# Patient Record
Sex: MALE | Race: WHITE | NOT HISPANIC OR LATINO | Employment: OTHER | URBAN - NONMETROPOLITAN AREA
[De-identification: names, ages, dates, MRNs, and addresses within clinical notes are randomized per-mention and may not be internally consistent; named-entity substitution may affect disease eponyms.]

---

## 2019-04-08 ENCOUNTER — TRANSCRIBE ORDERS (OUTPATIENT)
Dept: ADMINISTRATIVE | Facility: HOSPITAL | Age: 59
End: 2019-04-08

## 2019-04-08 DIAGNOSIS — M25.512 LEFT SHOULDER PAIN, UNSPECIFIED CHRONICITY: Primary | ICD-10-CM

## 2019-04-15 ENCOUNTER — HOSPITAL ENCOUNTER (OUTPATIENT)
Dept: MRI IMAGING | Facility: HOSPITAL | Age: 59
Discharge: HOME | End: 2019-04-15
Admitting: ORTHOPAEDIC SURGERY

## 2019-04-15 DIAGNOSIS — M25.512 LEFT SHOULDER PAIN, UNSPECIFIED CHRONICITY: ICD-10-CM

## 2019-04-15 PROCEDURE — 73221 MRI JOINT UPR EXTREM W/O DYE: CPT

## 2019-06-10 ENCOUNTER — APPOINTMENT (OUTPATIENT)
Dept: PREADMISSION TESTING | Facility: HOSPITAL | Age: 59
End: 2019-06-10

## 2019-06-10 VITALS
SYSTOLIC BLOOD PRESSURE: 146 MMHG | DIASTOLIC BLOOD PRESSURE: 92 MMHG | RESPIRATION RATE: 18 BRPM | HEIGHT: 69 IN | BODY MASS INDEX: 33.83 KG/M2 | OXYGEN SATURATION: 98 % | HEART RATE: 103 BPM | WEIGHT: 228.4 LBS

## 2019-06-10 RX ORDER — ALLOPURINOL 100 MG/1
100 TABLET ORAL 2 TIMES DAILY
COMMUNITY

## 2019-06-17 ENCOUNTER — ANESTHESIA EVENT (OUTPATIENT)
Dept: PERIOP | Facility: HOSPITAL | Age: 59
End: 2019-06-17

## 2019-06-17 ENCOUNTER — HOSPITAL ENCOUNTER (OUTPATIENT)
Facility: HOSPITAL | Age: 59
Setting detail: HOSPITAL OUTPATIENT SURGERY
Discharge: HOME | End: 2019-06-17
Attending: ORTHOPAEDIC SURGERY | Admitting: ORTHOPAEDIC SURGERY

## 2019-06-17 ENCOUNTER — ANESTHESIA (OUTPATIENT)
Dept: PERIOP | Facility: HOSPITAL | Age: 59
End: 2019-06-17

## 2019-06-17 VITALS
RESPIRATION RATE: 20 BRPM | OXYGEN SATURATION: 95 % | TEMPERATURE: 97.2 F | HEART RATE: 84 BPM | DIASTOLIC BLOOD PRESSURE: 86 MMHG | SYSTOLIC BLOOD PRESSURE: 137 MMHG

## 2019-06-17 PROBLEM — M75.102 LEFT ROTATOR CUFF TEAR: Status: ACTIVE | Noted: 2019-06-17

## 2019-06-17 LAB
DEPRECATED RDW RBC AUTO: 41.3 FL (ref 40–54)
ERYTHROCYTE [DISTWIDTH] IN BLOOD BY AUTOMATED COUNT: 12.3 % (ref 12–15)
HCT VFR BLD AUTO: 45.8 % (ref 40–52)
HGB BLD-MCNC: 16 G/DL (ref 14–18)
INR PPP: 0.97 (ref 0.91–1.09)
MCH RBC QN AUTO: 32.1 PG (ref 28–32)
MCHC RBC AUTO-ENTMCNC: 34.9 G/DL (ref 33–36)
MCV RBC AUTO: 91.8 FL (ref 82–95)
PLATELET # BLD AUTO: 189 10*3/MM3 (ref 130–400)
PMV BLD AUTO: 9.8 FL (ref 6–12)
PROTHROMBIN TIME: 13.2 SECONDS (ref 11.9–14.6)
RBC # BLD AUTO: 4.99 10*6/MM3 (ref 4.8–5.9)
WBC NRBC COR # BLD: 6.41 10*3/MM3 (ref 4.8–10.8)

## 2019-06-17 PROCEDURE — C1713 ANCHOR/SCREW BN/BN,TIS/BN: HCPCS | Performed by: ORTHOPAEDIC SURGERY

## 2019-06-17 PROCEDURE — 25010000002 FENTANYL CITRATE (PF) 100 MCG/2ML SOLUTION: Performed by: ANESTHESIOLOGY

## 2019-06-17 PROCEDURE — 25010000002 ROPIVACAINE PER 1 MG: Performed by: ANESTHESIOLOGY

## 2019-06-17 PROCEDURE — 25010000002 PROPOFOL 10 MG/ML EMULSION: Performed by: NURSE ANESTHETIST, CERTIFIED REGISTERED

## 2019-06-17 PROCEDURE — 85027 COMPLETE CBC AUTOMATED: CPT | Performed by: ORTHOPAEDIC SURGERY

## 2019-06-17 PROCEDURE — 25010000002 DEXAMETHASONE PER 1 MG: Performed by: NURSE ANESTHETIST, CERTIFIED REGISTERED

## 2019-06-17 PROCEDURE — 25010000002 EPINEPHRINE PER 0.1 MG: Performed by: ORTHOPAEDIC SURGERY

## 2019-06-17 PROCEDURE — 25010000002 ONDANSETRON PER 1 MG: Performed by: NURSE ANESTHETIST, CERTIFIED REGISTERED

## 2019-06-17 PROCEDURE — 25010000002 MIDAZOLAM PER 1 MG: Performed by: ANESTHESIOLOGY

## 2019-06-17 PROCEDURE — 85610 PROTHROMBIN TIME: CPT | Performed by: ORTHOPAEDIC SURGERY

## 2019-06-17 DEVICE — SUT FIBERLINK W/SUT TP 1.3MM WHT/BLU: Type: IMPLANTABLE DEVICE | Status: FUNCTIONAL

## 2019-06-17 DEVICE — SYS SUT/ANCH BIOCOMP SPEEDBRIDGE SWIVELK 4.75X19.1: Type: IMPLANTABLE DEVICE | Status: FUNCTIONAL

## 2019-06-17 DEVICE — SUT/ANCH BIOCOMP PUSH/LK 2.9X12.5MM: Type: IMPLANTABLE DEVICE | Status: FUNCTIONAL

## 2019-06-17 RX ORDER — ROCURONIUM BROMIDE 10 MG/ML
INJECTION, SOLUTION INTRAVENOUS AS NEEDED
Status: DISCONTINUED | OUTPATIENT
Start: 2019-06-17 | End: 2019-06-17 | Stop reason: SURG

## 2019-06-17 RX ORDER — SODIUM CHLORIDE 0.9 % (FLUSH) 0.9 %
1-10 SYRINGE (ML) INJECTION AS NEEDED
Status: DISCONTINUED | OUTPATIENT
Start: 2019-06-17 | End: 2019-06-17 | Stop reason: HOSPADM

## 2019-06-17 RX ORDER — ASPIRIN 325 MG
325 TABLET, DELAYED RELEASE (ENTERIC COATED) ORAL 2 TIMES DAILY
Qty: 42 TABLET | Refills: 0 | Status: SHIPPED | OUTPATIENT
Start: 2019-06-17 | End: 2019-07-08

## 2019-06-17 RX ORDER — MAGNESIUM HYDROXIDE 1200 MG/15ML
LIQUID ORAL AS NEEDED
Status: DISCONTINUED | OUTPATIENT
Start: 2019-06-17 | End: 2019-06-17 | Stop reason: HOSPADM

## 2019-06-17 RX ORDER — OXYCODONE AND ACETAMINOPHEN 7.5; 325 MG/1; MG/1
1-2 TABLET ORAL EVERY 4 HOURS PRN
Qty: 60 TABLET | Refills: 0 | Status: SHIPPED | OUTPATIENT
Start: 2019-06-17

## 2019-06-17 RX ORDER — MIDAZOLAM HYDROCHLORIDE 1 MG/ML
1 INJECTION INTRAMUSCULAR; INTRAVENOUS
Status: DISCONTINUED | OUTPATIENT
Start: 2019-06-17 | End: 2019-06-17 | Stop reason: HOSPADM

## 2019-06-17 RX ORDER — PROPOFOL 10 MG/ML
VIAL (ML) INTRAVENOUS AS NEEDED
Status: DISCONTINUED | OUTPATIENT
Start: 2019-06-17 | End: 2019-06-17 | Stop reason: SURG

## 2019-06-17 RX ORDER — METOCLOPRAMIDE HYDROCHLORIDE 5 MG/ML
5 INJECTION INTRAMUSCULAR; INTRAVENOUS
Status: DISCONTINUED | OUTPATIENT
Start: 2019-06-17 | End: 2019-06-17 | Stop reason: HOSPADM

## 2019-06-17 RX ORDER — EPINEPHRINE 1 MG/ML
INJECTION, SOLUTION, CONCENTRATE INTRAVENOUS AS NEEDED
Status: DISCONTINUED | OUTPATIENT
Start: 2019-06-17 | End: 2019-06-17 | Stop reason: HOSPADM

## 2019-06-17 RX ORDER — NALOXONE HCL 0.4 MG/ML
0.04 VIAL (ML) INJECTION AS NEEDED
Status: DISCONTINUED | OUTPATIENT
Start: 2019-06-17 | End: 2019-06-17 | Stop reason: HOSPADM

## 2019-06-17 RX ORDER — ONDANSETRON 2 MG/ML
4 INJECTION INTRAMUSCULAR; INTRAVENOUS AS NEEDED
Status: DISCONTINUED | OUTPATIENT
Start: 2019-06-17 | End: 2019-06-17 | Stop reason: HOSPADM

## 2019-06-17 RX ORDER — PHENYLEPHRINE HCL IN 0.9% NACL 0.8MG/10ML
SYRINGE (ML) INTRAVENOUS AS NEEDED
Status: DISCONTINUED | OUTPATIENT
Start: 2019-06-17 | End: 2019-06-17 | Stop reason: SURG

## 2019-06-17 RX ORDER — EPHEDRINE SULFATE 50 MG/ML
INJECTION INTRAVENOUS AS NEEDED
Status: DISCONTINUED | OUTPATIENT
Start: 2019-06-17 | End: 2019-06-17 | Stop reason: SURG

## 2019-06-17 RX ORDER — ONDANSETRON 4 MG/1
4 TABLET, FILM COATED ORAL EVERY 8 HOURS PRN
Qty: 20 TABLET | Refills: 1 | Status: SHIPPED | OUTPATIENT
Start: 2019-06-17

## 2019-06-17 RX ORDER — SODIUM CHLORIDE, SODIUM LACTATE, POTASSIUM CHLORIDE, CALCIUM CHLORIDE 600; 310; 30; 20 MG/100ML; MG/100ML; MG/100ML; MG/100ML
100 INJECTION, SOLUTION INTRAVENOUS CONTINUOUS
Status: DISCONTINUED | OUTPATIENT
Start: 2019-06-17 | End: 2019-06-17 | Stop reason: HOSPADM

## 2019-06-17 RX ORDER — MORPHINE SULFATE 2 MG/ML
2 INJECTION, SOLUTION INTRAMUSCULAR; INTRAVENOUS
Status: DISCONTINUED | OUTPATIENT
Start: 2019-06-17 | End: 2019-06-17 | Stop reason: HOSPADM

## 2019-06-17 RX ORDER — SODIUM CHLORIDE 0.9 % (FLUSH) 0.9 %
3 SYRINGE (ML) INJECTION AS NEEDED
Status: DISCONTINUED | OUTPATIENT
Start: 2019-06-17 | End: 2019-06-17 | Stop reason: HOSPADM

## 2019-06-17 RX ORDER — BUPIVACAINE HCL/0.9 % NACL/PF 0.1 %
2 PLASTIC BAG, INJECTION (ML) EPIDURAL ONCE
Status: COMPLETED | OUTPATIENT
Start: 2019-06-17 | End: 2019-06-17

## 2019-06-17 RX ORDER — IPRATROPIUM BROMIDE AND ALBUTEROL SULFATE 2.5; .5 MG/3ML; MG/3ML
3 SOLUTION RESPIRATORY (INHALATION) ONCE AS NEEDED
Status: DISCONTINUED | OUTPATIENT
Start: 2019-06-17 | End: 2019-06-17 | Stop reason: HOSPADM

## 2019-06-17 RX ORDER — FENTANYL CITRATE 50 UG/ML
25 INJECTION, SOLUTION INTRAMUSCULAR; INTRAVENOUS
Status: DISCONTINUED | OUTPATIENT
Start: 2019-06-17 | End: 2019-06-17 | Stop reason: HOSPADM

## 2019-06-17 RX ORDER — ONDANSETRON 2 MG/ML
INJECTION INTRAMUSCULAR; INTRAVENOUS AS NEEDED
Status: DISCONTINUED | OUTPATIENT
Start: 2019-06-17 | End: 2019-06-17 | Stop reason: SURG

## 2019-06-17 RX ORDER — ROPIVACAINE HYDROCHLORIDE 5 MG/ML
INJECTION, SOLUTION EPIDURAL; INFILTRATION; PERINEURAL
Status: COMPLETED | OUTPATIENT
Start: 2019-06-17 | End: 2019-06-17

## 2019-06-17 RX ORDER — ACETAMINOPHEN 500 MG
1000 TABLET ORAL ONCE
Status: COMPLETED | OUTPATIENT
Start: 2019-06-17 | End: 2019-06-17

## 2019-06-17 RX ORDER — MIDAZOLAM HYDROCHLORIDE 1 MG/ML
2 INJECTION INTRAMUSCULAR; INTRAVENOUS
Status: DISCONTINUED | OUTPATIENT
Start: 2019-06-17 | End: 2019-06-17 | Stop reason: HOSPADM

## 2019-06-17 RX ORDER — DOCUSATE SODIUM 100 MG/1
100 CAPSULE, LIQUID FILLED ORAL 2 TIMES DAILY
Qty: 60 CAPSULE | Refills: 1 | Status: SHIPPED | OUTPATIENT
Start: 2019-06-17

## 2019-06-17 RX ORDER — SODIUM CHLORIDE, SODIUM LACTATE, POTASSIUM CHLORIDE, CALCIUM CHLORIDE 600; 310; 30; 20 MG/100ML; MG/100ML; MG/100ML; MG/100ML
1000 INJECTION, SOLUTION INTRAVENOUS CONTINUOUS
Status: DISCONTINUED | OUTPATIENT
Start: 2019-06-17 | End: 2019-06-17 | Stop reason: HOSPADM

## 2019-06-17 RX ORDER — HYDRALAZINE HYDROCHLORIDE 20 MG/ML
5 INJECTION INTRAMUSCULAR; INTRAVENOUS
Status: DISCONTINUED | OUTPATIENT
Start: 2019-06-17 | End: 2019-06-17 | Stop reason: HOSPADM

## 2019-06-17 RX ORDER — LABETALOL HYDROCHLORIDE 5 MG/ML
5 INJECTION, SOLUTION INTRAVENOUS
Status: DISCONTINUED | OUTPATIENT
Start: 2019-06-17 | End: 2019-06-17 | Stop reason: HOSPADM

## 2019-06-17 RX ORDER — LIDOCAINE HYDROCHLORIDE 20 MG/ML
INJECTION, SOLUTION INFILTRATION; PERINEURAL AS NEEDED
Status: DISCONTINUED | OUTPATIENT
Start: 2019-06-17 | End: 2019-06-17 | Stop reason: SURG

## 2019-06-17 RX ORDER — FENTANYL CITRATE 50 UG/ML
25 INJECTION, SOLUTION INTRAMUSCULAR; INTRAVENOUS AS NEEDED
Status: DISCONTINUED | OUTPATIENT
Start: 2019-06-17 | End: 2019-06-17 | Stop reason: HOSPADM

## 2019-06-17 RX ORDER — SODIUM CHLORIDE 0.9 % (FLUSH) 0.9 %
3 SYRINGE (ML) INJECTION EVERY 12 HOURS SCHEDULED
Status: DISCONTINUED | OUTPATIENT
Start: 2019-06-17 | End: 2019-06-17 | Stop reason: HOSPADM

## 2019-06-17 RX ORDER — OXYCODONE AND ACETAMINOPHEN 10; 325 MG/1; MG/1
1 TABLET ORAL ONCE AS NEEDED
Status: DISCONTINUED | OUTPATIENT
Start: 2019-06-17 | End: 2019-06-17 | Stop reason: HOSPADM

## 2019-06-17 RX ORDER — LIDOCAINE HYDROCHLORIDE 40 MG/ML
SOLUTION TOPICAL AS NEEDED
Status: DISCONTINUED | OUTPATIENT
Start: 2019-06-17 | End: 2019-06-17 | Stop reason: SURG

## 2019-06-17 RX ORDER — FLUMAZENIL 0.1 MG/ML
0.2 INJECTION INTRAVENOUS AS NEEDED
Status: DISCONTINUED | OUTPATIENT
Start: 2019-06-17 | End: 2019-06-17 | Stop reason: HOSPADM

## 2019-06-17 RX ORDER — DEXAMETHASONE SODIUM PHOSPHATE 4 MG/ML
INJECTION, SOLUTION INTRA-ARTICULAR; INTRALESIONAL; INTRAMUSCULAR; INTRAVENOUS; SOFT TISSUE AS NEEDED
Status: DISCONTINUED | OUTPATIENT
Start: 2019-06-17 | End: 2019-06-17 | Stop reason: SURG

## 2019-06-17 RX ADMIN — EPHEDRINE SULFATE 15 MG: 50 INJECTION INTRAVENOUS at 17:32

## 2019-06-17 RX ADMIN — LIDOCAINE HYDROCHLORIDE 100 MG: 20 INJECTION, SOLUTION INFILTRATION; PERINEURAL at 16:53

## 2019-06-17 RX ADMIN — PROPOFOL 170 MG: 10 INJECTION, EMULSION INTRAVENOUS at 16:53

## 2019-06-17 RX ADMIN — Medication 80 MCG: at 17:20

## 2019-06-17 RX ADMIN — EPHEDRINE SULFATE 15 MG: 50 INJECTION INTRAVENOUS at 17:37

## 2019-06-17 RX ADMIN — SODIUM CHLORIDE, POTASSIUM CHLORIDE, SODIUM LACTATE AND CALCIUM CHLORIDE: 600; 310; 30; 20 INJECTION, SOLUTION INTRAVENOUS at 16:52

## 2019-06-17 RX ADMIN — ROPIVACAINE HYDROCHLORIDE 20 ML: 5 INJECTION, SOLUTION EPIDURAL; INFILTRATION; PERINEURAL at 15:46

## 2019-06-17 RX ADMIN — LIDOCAINE HYDROCHLORIDE 1 EACH: 40 SOLUTION TOPICAL at 16:55

## 2019-06-17 RX ADMIN — DEXAMETHASONE SODIUM PHOSPHATE 8 MG: 4 INJECTION, SOLUTION INTRAMUSCULAR; INTRAVENOUS at 17:00

## 2019-06-17 RX ADMIN — EPHEDRINE SULFATE 15 MG: 50 INJECTION INTRAVENOUS at 17:45

## 2019-06-17 RX ADMIN — ONDANSETRON HYDROCHLORIDE 4 MG: 2 SOLUTION INTRAMUSCULAR; INTRAVENOUS at 18:20

## 2019-06-17 RX ADMIN — ACETAMINOPHEN 1000 MG: 500 TABLET, FILM COATED ORAL at 15:38

## 2019-06-17 RX ADMIN — Medication 2 G: at 17:02

## 2019-06-17 RX ADMIN — ROCURONIUM BROMIDE 35 MG: 10 INJECTION INTRAVENOUS at 16:53

## 2019-06-17 RX ADMIN — MIDAZOLAM 2 MG: 1 INJECTION INTRAMUSCULAR; INTRAVENOUS at 15:42

## 2019-06-17 RX ADMIN — FENTANYL CITRATE 25 MCG: 50 INJECTION, SOLUTION INTRAMUSCULAR; INTRAVENOUS at 15:42

## 2019-06-17 NOTE — H&P
Pt Name: Kyler Torres  MRN: [id number]  YOB: 1960  Date: 6/17/2019      HPI: 58 y.o. year old male with a known rotator cuff tear of the left shoulder. Chronic pain and weakness have been non responsive to conservative treatments, not limited to, NSAIDs, corticosteroid injections, and physical therapy.      Past Medical/Surgical History:   Past Medical History:   Diagnosis Date   • Arthritis     gouty   • Cancer (CMS/HCC)     skin cancer     Past Surgical History:   Procedure Laterality Date   • CARPAL TUNNEL RELEASE      right wrist   • COLON SURGERY      colon resection   • KNEE ARTHROSCOPY W/ MENISCAL REPAIR Left    • SHOULDER ARTHROSCOPY      left shoulder impingement repair   • WRIST SURGERY      cartiledge repair left       Social History:   Smoking: Patient does chew tobacco but denies smoking, he is encouraged to quit  Alcohol: occasional/rare    Medications:   Current Facility-Administered Medications   Medication Dose Route Frequency Provider Last Rate Last Dose   • buffered lidocaine syringe 0.5 mL  0.5 mL Intradermal Once PRN Olvin Drew MD       • buffered lidocaine syringe 0.5 mL  0.5 mL Injection Once PRN Jeni Castillo MD       • ceFAZolin in 0.9% normal saline (ANCEF) IVPB solution 2 g  2 g Intravenous Once Olvin Drew MD       • fentaNYL citrate (PF) (SUBLIMAZE) injection 25 mcg  25 mcg Intravenous Q5 Min PRN Jeni Catsillo MD   25 mcg at 06/17/19 1542   • lactated ringers infusion 1,000 mL  1,000 mL Intravenous Continuous Olvin Drew MD       • lactated ringers infusion  100 mL/hr Intravenous Continuous Jeni Castillo MD       • midazolam (VERSED) injection 1 mg  1 mg Intravenous Q5 Min PRN Jeni Castillo MD        Or   • midazolam (VERSED) injection 2 mg  2 mg Intravenous Q5 Min PRN Jeni Castillo MD   2 mg at 06/17/19 1542   • sodium chloride 0.9 % flush 1-10 mL  1-10 mL Intravenous PRN Jeni Castillo MD       • sodium chloride  0.9 % flush 3 mL  3 mL Intravenous PRN Olvin Drew MD       • sodium chloride 0.9 % flush 3 mL  3 mL Intravenous Q12H Jeni Castillo MD           Allergies: No Known Allergies    Review of systems:     * Constitutional: negative     * HEENT: negative     * Skin: negative     * Cardiac: negative     * Respiratory: negative     * GI: negative     * : negative     * Neuro: negative     * CNS: negative     * Extremities: negative     * Endcrine: negative     Physical Exam:   /92   Pulse 69   Temp 97.9 °F (36.6 °C) (Temporal)   Resp 18   SpO2 95%     - General: normal development and appearance     - HEENT: normocephalic, JEYSON     - Skin: pink, warm, normal tone     - Neck: supple, no masses,     - Chest: no rales or wheezes, normal expansion     - Heart: RRR, no murmurs/gallops     - Abdomen: soft, nondistended, nontender, normal sounds     - Vascular: normal pulses, color, tone     - Pysch/Neuro: normal affect, mood, alert and oriented     - Musculoskeletal: Physical exam of the patient's left shoulder shows the skin is clean, dry, and intact. No deformities, lesions, or errythema noted. NVI distally and laterally. Tenderness to palpation laterally. Decreased range of motion and weakness secondary to pain.      Impression: Rotator cuff tear of the left shoulder.      Surgical Plan: Arthroscopic rotator cuff repair of the left shoulder.      Electronically signed by Olvin Drew MD on 6/17/2019 at 4:43 PM

## 2019-06-17 NOTE — ANESTHESIA PREPROCEDURE EVALUATION
Anesthesia Evaluation     Patient summary reviewed and Nursing notes reviewed   NPO Solid Status: > 8 hours             Airway   Mallampati: I  TM distance: >3 FB  Neck ROM: full  No difficulty expected  Dental - normal exam     Pulmonary - normal exam   (-) not a smoker  Cardiovascular - normal exam  Exercise tolerance: good (4-7 METS)        Neuro/Psych  GI/Hepatic/Renal/Endo    (+) obesity,       Musculoskeletal         ROS comment: gout  Abdominal  - normal exam    Bowel sounds: normal.   Substance History      OB/GYN          Other   (+) arthritis   history of cancer (colon cancer) remission                    Anesthesia Plan    ASA 2     general with block     intravenous induction   Anesthetic plan, all risks, benefits, and alternatives have been provided, discussed and informed consent has been obtained with: patient.

## 2019-06-17 NOTE — DISCHARGE INSTRUCTIONS
YOUR NEXT PAIN MEDICATION IS DUE AT______________         General Anesthesia, Adult, Care After  Refer to this sheet in the next few weeks. These instructions provide you with information on caring for yourself after your procedure. Your health care provider may also give you more specific instructions. Your treatment has been planned according to current medical practices, but problems sometimes occur. Call your health care provider if you have any problems or questions after your procedure.  WHAT TO EXPECT AFTER THE PROCEDURE  After the procedure, it is typical to experience:  · Sleepiness.  · Nausea and vomiting.  HOME CARE INSTRUCTIONS  · For the first 24 hours after general anesthesia:  ¨ Have a responsible person with you.  ¨ Do not drive a car. If you are alone, do not take public transportation.  ¨ Do not drink alcohol.  ¨ Do not take medicine that has not been prescribed by your health care provider.  ¨ Do not sign important papers or make important decisions.  ¨ You may resume a normal diet and activities as directed by your health care provider.  · Change bandages (dressings) as directed.  · If you have questions or problems that seem related to general anesthesia, call the hospital and ask for the anesthetist or anesthesiologist on call.  SEEK MEDICAL CARE IF:  · You have nausea and vomiting that continue the day after anesthesia.  · You develop a rash.  SEEK IMMEDIATE MEDICAL CARE IF:    · You have difficulty breathing.  · You have chest pain.  · You have any allergic problems.     This information is not intended to replace advice given to you by your health care provider. Make sure you discuss any questions you have with your health care provider.     Document Released: 03/26/2002 Document Revised: 01/08/2016 Document Reviewed: 07/03/2014  [employer] Interactive Patient Education ©2016 [employer]    CALL YOUR PHYSICIAN IF YOU EXPERIENCE  INCREASED PAIN NOT HELPED BY YOUR PAIN MEDICATION.    .                                               Fall Prevention in the Home      Falls can cause injuries. They can happen to people of all ages. There are many things you can do to make your home safe and to help prevent falls.    WHAT CAN I DO ON THE OUTSIDE OF MY HOME?  · Regularly fix the edges of walkways and driveways and fix any cracks.  · Remove anything that might make you trip as you walk through a door, such as a raised step or threshold.  · Trim any bushes or trees on the path to your home.  · Use bright outdoor lighting.  · Clear any walking paths of anything that might make someone trip, such as rocks or tools.  · Regularly check to see if handrails are loose or broken. Make sure that both sides of any steps have handrails.  · Any raised decks and porches should have guardrails on the edges.  · Have any leaves, snow, or ice cleared regularly.  · Use sand or salt on walking paths during winter.  · Clean up any spills in your garage right away. This includes oil or grease spills.  WHAT CAN I DO IN THE BATHROOM?    · Use night lights.  · Install grab bars by the toilet and in the tub and shower. Do not use towel bars as grab bars.  · Use non-skid mats or decals in the tub or shower.  · If you need to sit down in the shower, use a plastic, non-slip stool.  · Keep the floor dry. Clean up any water that spills on the floor as soon as it happens.  · Remove soap buildup in the tub or shower regularly.  · Attach bath mats securely with double-sided non-slip rug tape.  · Do not have throw rugs and other things on the floor that can make you trip.  WHAT CAN I DO IN THE BEDROOM?  · Use night lights.  · Make sure that you have a light by your bed that is easy to reach.  · Do not use any sheets or blankets that are too big for your bed. They should not hang down onto the floor.  · Have a firm chair that has side arms. You can use this for support while you get dressed.  · Do not have throw rugs and other things on the floor  that can make you trip.  WHAT CAN I DO IN THE KITCHEN?  · Clean up any spills right away.  · Avoid walking on wet floors.  · Keep items that you use a lot in easy-to-reach places.  · If you need to reach something above you, use a strong step stool that has a grab bar.  · Keep electrical cords out of the way.  · Do not use floor polish or wax that makes floors slippery. If you must use wax, use non-skid floor wax.  · Do not have throw rugs and other things on the floor that can make you trip.  WHAT CAN I DO WITH MY STAIRS?  · Do not leave any items on the stairs.  · Make sure that there are handrails on both sides of the stairs and use them. Fix handrails that are broken or loose. Make sure that handrails are as long as the stairways.  · Check any carpeting to make sure that it is firmly attached to the stairs. Fix any carpet that is loose or worn.  · Avoid having throw rugs at the top or bottom of the stairs. If you do have throw rugs, attach them to the floor with carpet tape.  · Make sure that you have a light switch at the top of the stairs and the bottom of the stairs. If you do not have them, ask someone to add them for you.  WHAT ELSE CAN I DO TO HELP PREVENT FALLS?  · Wear shoes that:  ¨ Do not have high heels.  ¨ Have rubber bottoms.  ¨ Are comfortable and fit you well.  ¨ Are closed at the toe. Do not wear sandals.  · If you use a stepladder:  ¨ Make sure that it is fully opened. Do not climb a closed stepladder.  ¨ Make sure that both sides of the stepladder are locked into place.  ¨ Ask someone to hold it for you, if possible.  · Clearly alondra and make sure that you can see:  ¨ Any grab bars or handrails.  ¨ First and last steps.  ¨ Where the edge of each step is.  · Use tools that help you move around (mobility aids) if they are needed. These include:  ¨ Canes.  ¨ Walkers.  ¨ Scooters.  ¨ Crutches.  · Turn on the lights when you go into a dark area. Replace any light bulbs as soon as they burn  out.  · Set up your furniture so you have a clear path. Avoid moving your furniture around.  · If any of your floors are uneven, fix them.  · If there are any pets around you, be aware of where they are.  · Review your medicines with your doctor. Some medicines can make you feel dizzy. This can increase your chance of falling.  Ask your doctor what other things that you can do to help prevent falls.     This information is not intended to replace advice given to you by your health care provider. Make sure you discuss any questions you have with your health care provider.     Document Released: 10/14/2010 Document Revised: 05/03/2016 Document Reviewed: 01/22/2016  [employer] Interactive Patient Education ©2016 [employer]     PATIENT/FAMILY/RESPONSIBLE PARTY VERBALIZES UNDERSTANDING OF ABOVE EDUCATION.  COPY OF PAIN SCALE GIVEN AND REVIEWED WITH VERBALIZED UNDERSTANDING.    What to expect after a Nerve Block  Nerve blocks administered to block pain affect many types of nerves, including those nerves that control movement, pain, and normal sensation.  Following a nerve block, you may notice some bruising at the site where the block was given.  You may experience sensations such as:  numbness of the affected area or limb, tingling, heaviness (that is the limb feels heavy to you), weakness or inability to move the affected arm or leg, or a feeling as if your arm or leg has “fallen asleep”.    A nerve block can last from 9-18 hours depending on the medications used.  Usually the weakness wears off first followed by the tingling and heaviness.  As the block wears off, you may begin to notice pain; however, this sequence of events may occur in any order.  Typically, you will be able to move your limb before you will feel it.  Once a nerve block begins to wear off, the effects are usually completely gone within 60 minutes.    If you experience continued side effects that you believe are block related for longer than 48  hours, please call your healthcare provider.  Please see block-specific instructions below.    Instructions for any block involving the shoulder or arm  • If you have had any kind of shoulder/arm block, you will go home with your arm in a sling.  Wear the sling until the block has completely worn off.  You may be required to wear it for a longer period of time per your surgeon’s recommendations.  • I you have had a shoulder/arm block; it is a good idea to sleep on a recliner with pillows under your arm.    Note:  If you have severe or prolonged shortness of breath, please seek medical assistance as soon as possible.    Protection of a “blocked” arm (limb)  • After a nerve block, you cannot feel pain, pressure, or extremes of temperature in the affected limb.  And because of this, your blocked limb is at more risk for injury.  For example, it is possible to burn your limb on an extremely hot surface without feeling it.  • When resting, it is important to reposition your limb periodically to avoid prolonged pressure on it.  This may require the use of pillows and padding.  • While sleeping, you should avoid rolling onto the affected limb or putting too much pressure on it.  • If you have a cast or tight dressing, check the color of your fingers of the affected limb.  Call your surgeon if they look discolored (that is, dusky, dark colored)  • Use caution in cold weather.  Cover your limb appropriately to protect it from the cold.  Pain Management  Your surgeon will give you a prescription for pain medication.  Begin taking this before the nerve block wears off.  Bear in mind that sometimes the block can wear off in the middle of the night.         UPPER EXTREMITY POST-OP INSTRUCTIONS - DR. AKINS    IMPORTANT PHONE NUMBERS:  • For emergencies, please call [phone]  • You may reach Dr. Akins and clinical staff at [phone] [phone] 8:00 am-5:00 pm  • After 5pm or on the weekends, please call [phone]  • Call immediately if  you have any of the following symptoms:     Elevated temperature above 101.5 degrees for more than 48 hours after surgery     Persistent drainage from wound     Severe pain around surgical site    Sling use: The sling is provided for your comfort and to ensure proper healing of your repair following surgery. Please place the abduction pillow with the curved side against your side and the sling on the side of the pillow. Your surgery requires that you wear the sling if noted below.  __X__ At all times except bathing, dressing, and therapy. Also wear the sling during sleep.    Bathing:  _X__You may remove you dressing and shower on the 4th day after surgery (Ex. Monday surgery, shower on Friday)  ** if you are told to it is ok to remove your dressing and shower, DO NOT SOAK your incisions in a tub.    Dressings: Keep dressing/splint intact unless instructed otherwise below. SOME DRAINAGE IS NORMAL!    • DO NOT touch or apply ointment to the incision.    • DO NOT remove the steri-strips over the incisions (if you have steri-strips). They will         generally fall off on their own or can be removed 1 weeksafter surgery.    • If you have yellow gauze and it comes off, do not worry about it. Leave them off.   • Signs of infection that warrant a phone call to our clinical line:     o Excessive drainage or redness     o Red streaking coming away from the incision  o Increased pain  o Increased temperature above 101 degrees    Physical Therapy:        *  Your physical therapy status will be discussed with you postoperatively and at your first post-op appointment. Some injuries will not require physical therapy.      *  If you have a shoulder manipulation, please schedule therapy for the next day    Medications: You will be discharged with the appropriate medications following your surgery. Fill these at the pharmacy and take them as directed on the label. Not all of the medications below may be prescribed. Occasionally,  other medications may be prescribed with specific instructions.    Percocet/Lortab (oxycodone/hydrocodone with tylenol) - Pain Medication, will cause drowsiness, possibly itchiness (this is NOT an allergy - use benadryl or an over the counter allergy medication such as Claritin or Zyrtec)     o Take 1-2 tablets every 4-6 hours. DO NOT EXCEED 4,000mg of Tylenol in 24 hours.  **DO NOT MIX WITH ALCOHOL, DRIVE WHILE TAKING, OR TAKE with extra TYLENOL**    Colace (Docusate) - stool softener, used for constipation. Take this only if you feel constipated.    Zofran (Ondansetron) or Phenergan - Anti-nausea medication, will cause drowsiness    **If you are running low on pain medications, please notify us if you need a refill 24-48 hours prior to when you run out, so we can make arrangements to refill the prescription for you if we determine is necessary

## 2019-06-17 NOTE — OP NOTE
Patient Name: Shikha  : 1960  MRN: [id number]    DATE of SURGERY: 2019     SURGEON: Olvin Drew MD     ASSISTANT: None     PREOPERATIVE DIAGNOSIS:  1. Left shoulder acute traumatic complete rotator cuff tear  2. Left shoulder superior labrum anterior-posterior tear, proximal biceps tendinitis  3. Left shoulder primary osteoarthritis acromioclavicular joint  4. Left shoulder subacromial impingement syndrome     POSTOPERATIVE DIAGNOSIS:  5. Left shoulder acute traumatic complete rotator cuff tear  6. Left shoulder superior labrum anterior-posterior tear, proximal biceps tendinitis  7. Left shoulder primary osteoarthritis acromioclavicular joint  8. Left shoulder subacromial impingement syndrome     PROCEDURE PERFORMED:  1. Left shoulder arthroscopic rotator cuff repair  2. Left shoulder arthroscopic biceps tenodesis  3. Left shoulder arthroscopic distal clavicle excision  4. Left shoulder arthroscopic subacromial decompression     IMPLANTS: Arthrex 4.75 mm bioswivelock anchor, Arthrex 2.9 mm pushlock anchor     ANESTHESIA USED: General endotrachial anesthesia, interscalene block     ESTIMATED BLOOD LOSS: minimal     DRAINS: none     COMPLICATIONS: none     SPECIMENS: none     OPERATIVE INDICATIONS: This patient is a 58 y.o. male who presented to my clinic with complaints of progressive shoulder pain after a traumatic injury to the shoulder.  An MRI was obtained which showed the above-named diagnoses. Pain was not relieved with conservative treatment consisting of anti-inflammatories, corticosteroid injections, physical therapy.  Due to progressive pain and loss of function, surgical evaluation was discussed and the patient wished to proceed understanding risks, benefits, and alternatives. The surgical indications were to relieve pain, improve function, and prevent future disability in regards to the shoulder pathology dictated in the above diagnoses.  Risks included, but were not limited to, that of  anesthesia, bleeding, infection, pain, damage to local structures, need for further surgery, failure of repair, stiffness, failure of implants, and loss of function.       OPERATIVE FINDINGS:  1) Exam under anesthesia:  Full passive ROM, joint stable without laxity, skin intact  2) Glenohumeral Joint:       A) Chondral surfaces: intact       B) Labrum: tear of the superior labrum, unstable to probing, positive peel-back sign       C) Biceps:  Synovitis, hypertrophy, partial tearing       D) Rotator Cuff: Complete full thickness tear supraspinatus, crescent shaped, tendon/bone quality good, adequate excursion  3) Subacromial space:         A) Large amount of dense vascular bursa         B) Type 3 acromium, hypertrophic coracoacromial ligament         C) Bursal surface rotator cuff:  Complete tear as above         D) AC joint:  Hypertrophic with decreased joint space, osteophytes        PROCEDURE in DETAIL:  The patient was seen in the preoperative holding room, once again the informed consent was reviewed with the patient and signed.  The site of surgery was marked with the patient's agreement.  After being transported to the operating room, a timeout was performed identifying the correct patient as well as the operative site.  Dose appropriate IV antibiotics were given prior to incision.  The patient was positioned in the beach chair position, all bony prominences were protected and a sterile prep and drape was performed.  A standard posterior arthroscopy portal was established and an outside-in technique was utilized to establish an anterior portal within the rotator interval.  An arthroscopic probe was inserted and a thorough exam of the glenohumeral joint was performed.     Attention was first turned to the biceps-labral complex and the probe was used to identify a SLAP lesion of the superior glenoid that was unstable to probing.  A ramp test was performed on the biceps tendon as well identifying synovitis and  hypertrophy of the tendon.  A biceps tenotomy was then performed at the biceps-labral anchor with a cautery device.  The biceps tendon was then captured and controlled with a loop intact technique using a Arthrex fiber link.  The free end of the fiber link was then advanced to the eyelet of a 2.9 mm push lock anchor.  Anticipated biceps tenodesis site in line with the bicipital groove was identified and prepared with a drill.  The 2.9 mm push lock anchor was then advanced into the drilled tenodesis site.  The biceps tendon was then tensioned before final seating and fixation of the anchor.  The surrounding labrum was debrided with a shaver and the probe was reinserted showing the remaining labrum to be stable.     Attention was then turned to the intraarticular portion of the rotator cuff.  A probe was used to identify a complete tear of the rotator cuff and a shaver was used to debride the tendon.     The arthroscope was then transitioned to the subacromial space and an outside in technique was used to establish a lateral portal.  The arthroscopic shaver was introduced in the subacromial space and a complete bursectomy was performed with this device.       Attention was turned to the anterior and lateral edge of the acromium where soft tissue was removed with a cautery device.  Due to a prominent acromial spur causing impingement, an acromioplasty was performed with an arthroscopic reji converting this to a flat type 1 morphology.  The coracoaromial ligament was incised with a cautery device completing the subacromial decompression.     Attention was then turned to the rotator cuff tear which was visualized from the intraarticular space.  The tendon and greater tuberosity footprint were debrided followed by shuttling fibertape and fiberwire suture into the tear site with a suture passing device.  Fixation was then performed to the greater tuberosity footprint with a 4.75 mm bioswivelock anchor in knotless fashion.  The repair appeared to be anatomic.     Attention was then turned to the acromioclavicular joint where a cautery device was utilized to remove soft tissue from the distal end of the clavicle revealing decreased joint space and osteophyte formation.  An arthroscopic reji was introduced through the anterior portal removing the entire articular portion of the distal clavicle.  Less than 1 cm of bone was removed, preserving the posterior and superior ligaments to prevent instability of the joint.  The arthroscope was transitioned anteriorly noting the decompression to be adequate.     Free fluid was suctioned from the shoulder.  Portals were then closed with monocryl and a sterile dressing was applied followed by a sling.  The patient was awakened from anesthesia, transported to the recovery room in stable condition.     POSTOP PLAN:    1) Discharge home with family  2) Follow up in 2 weeks for a clinical check  3) Follow the following protocol: Small/Medium RCR

## 2019-06-17 NOTE — ANESTHESIA PROCEDURE NOTES
Airway  Urgency: elective    Airway not difficult    General Information and Staff    Patient location during procedure: OR  CRNA: Ted Rivera CRNA    Indications and Patient Condition  Indications for airway management: airway protection    Preoxygenated: yes  Mask difficulty assessment: 1 - vent by mask    Final Airway Details  Final airway type: endotracheal airway      Successful airway: ETT  Cuffed: yes   Successful intubation technique: direct laryngoscopy  Endotracheal tube insertion site: oral  Blade: Mont[place]  Blade size: 2  ETT size (mm): 7.5  Cormack-Lehane Classification: grade I - full view of glottis  Placement verified by: capnometry   Measured from: lips  ETT to lips (cm): 23  Number of attempts at approach: 1

## 2019-06-17 NOTE — BRIEF OP NOTE
SHOULDER ARTHROSCOPY WITH ROTATOR CUFF REPAIR, RESECTION DISTAL CLAVICLE, BICEPS TENDON REPAIR  Progress Note    Kyler Melissa  6/17/2019    Pre-op Diagnosis:   NONTRAUMATIC INCOMPLETE TEAR OF LEFT ROTATOR CUFF       Post-Op Diagnosis Codes:   SAME    Procedure/CPT® Codes:      Procedure(s):  LEFT SHOULDER ARTHROSCOPIC ROTATOR CUFF REPAIR TO INCLUDE SUBACROMIAL DECOMPRESSION  DISTAL CLAVICLE EXCISION  BICEPS TENDONESIS    Surgeon(s):  Olvin Drew MD    Anesthesia: Choice    Staff:   Circulator: Meli Cameron RN; Shawanda Evans RN  Scrub Person: Ghazala Yao; Angella Watt    Estimated Blood Loss: minimal    Urine Voided: * No values recorded between 6/17/2019  4:48 PM and 6/17/2019  6:38 PM *    Specimens:                None      Drains: None    Findings: Full-thickness rotator cuff tear superiorly with superior labral anterior to posterior (SLAP) lesion and biceps tendinitis with subacromial spurring and arthrosis of the acromioclavicular joint, left shoulder    Complications: None      Olvin Drew MD     Date: 6/17/2019  Time: 6:44 PM

## 2019-06-17 NOTE — ANESTHESIA PROCEDURE NOTES
Peripheral Block    Pre-sedation assessment completed: 6/17/2019 3:40 PM    Patient reassessed immediately prior to procedure    Patient location during procedure: pre-op  Start time: 6/17/2019 3:42 PM  Stop time: 6/17/2019 3:44 PM  Reason for block: procedure for pain, at surgeon's request, post-op pain management and requested by Dr Drew  Performed by  Anesthesiologist: Jeni Castillo MD  Preanesthetic Checklist  Completed: patient identified, site marked, surgical consent, pre-op evaluation, timeout performed, IV checked, risks and benefits discussed and monitors and equipment checked  Prep:  Pt Position: supine  Sterile barriers:gloves  Prep: ChloraPrep  Patient monitoring: blood pressure monitoring, continuous pulse oximetry and EKG  Procedure  Sedation:yes    Guidance:ultrasound guided, nerve stimulator and Brachial plexus identified and local anesthetic seen surrounding nerves  Images:still images obtained (picture printed and placed in patients chart)    Laterality:left  Block Type:interscalene  Injection Technique:single-shot  Needle Type:echogenic  Needle Gauge:22 G      Medications Used: ropivacaine (NAROPIN) injection 0.5 %, 20 mL      Post Assessment  Injection Assessment: negative aspiration for heme, no paresthesia on injection and incremental injection  Patient Tolerance:comfortable throughout block  Complications:no

## 2019-06-18 NOTE — ANESTHESIA POSTPROCEDURE EVALUATION
Patient: Kyler Torres    Procedure Summary     Date:  06/17/19 Room / Location:  [facility] OR  /  [facility] OR    Anesthesia Start:  1649 Anesthesia Stop:  1840    Procedures:       LEFT SHOULDER ARTHROSCOPIC ROTATOR CUFF REPAIR TO INCLUDE SUBACROMIAL DECOMPRESSION (Left Shoulder)      DISTAL CLAVICLE EXCISION (Left Shoulder)      BICEPS TENDONESIS (Left Arm Upper) Diagnosis:  (NONTRAUMATIC INCOMPLETE TEAR OF LEFT ROTATOR CUFF)    Surgeon:  Olvin Drew MD Provider:  David Osei CRNA    Anesthesia Type:  general with block ASA Status:  2          Anesthesia Type: general with block  Last vitals  BP   137/86 (06/17/19 2000)   Temp   97.2 °F (36.2 °C) (06/17/19 1910)   Pulse   84 (06/17/19 2000)   Resp   20 (06/17/19 2000)     SpO2   95 % (06/17/19 2000)     Post Anesthesia Care and Evaluation    Patient location during evaluation: PACU  Patient participation: complete - patient participated  Level of consciousness: awake and alert  Pain management: adequate  Airway patency: patent  Anesthetic complications: No anesthetic complications  PONV Status: none  Cardiovascular status: acceptable and stable  Respiratory status: acceptable and unassisted  Hydration status: acceptable

## 2019-06-18 NOTE — NURSING NOTE
Prescription for Colace, zofran, percocet, and asprin stapled to consent form and placed on chart.

## 2020-11-19 ENCOUNTER — TRANSCRIBE ORDERS (OUTPATIENT)
Dept: ADMINISTRATIVE | Facility: HOSPITAL | Age: 60
End: 2020-11-19

## 2020-11-19 DIAGNOSIS — M25.532 LEFT WRIST PAIN: Primary | ICD-10-CM

## 2020-11-25 ENCOUNTER — HOSPITAL ENCOUNTER (OUTPATIENT)
Dept: MRI IMAGING | Facility: HOSPITAL | Age: 60
Discharge: HOME | End: 2020-11-25
Admitting: ORTHOPAEDIC SURGERY

## 2020-11-25 DIAGNOSIS — M25.532 LEFT WRIST PAIN: ICD-10-CM

## 2020-11-25 PROCEDURE — 73221 MRI JOINT UPR EXTREM W/O DYE: CPT

## 2021-03-10 ENCOUNTER — OFFICE VISIT (OUTPATIENT)
Dept: NEUROSURGERY | Age: 61
End: 2021-03-10
Payer: COMMERCIAL

## 2021-03-10 VITALS
BODY MASS INDEX: 32.93 KG/M2 | SYSTOLIC BLOOD PRESSURE: 133 MMHG | DIASTOLIC BLOOD PRESSURE: 86 MMHG | HEART RATE: 87 BPM | WEIGHT: 230 LBS | HEIGHT: 70 IN

## 2021-03-10 DIAGNOSIS — R20.2 NUMBNESS AND TINGLING IN LEFT HAND: ICD-10-CM

## 2021-03-10 DIAGNOSIS — R20.0 NUMBNESS AND TINGLING IN LEFT HAND: ICD-10-CM

## 2021-03-10 PROCEDURE — 99203 OFFICE O/P NEW LOW 30 MIN: CPT | Performed by: NEUROLOGICAL SURGERY

## 2021-03-10 RX ORDER — ALLOPURINOL 100 MG/1
100 TABLET ORAL 2 TIMES DAILY
COMMUNITY

## 2021-03-10 NOTE — PROGRESS NOTES
[facility] Neurosurgery  Office Visit        Chief Complaint   Patient presents with    Consultation     pain in left hand. HISTORY OF PRESENT ILLNESS:      The patient is a 61 y.o. male who presents for a second opinion regarding intermittent pain, numbness and tingling in his left hand. He states this began suddenly when he injured his hand starting a chainsaw some time ago. He was pulling the starter and felt a pop on the dorsal surface of his wrist and noted immediate pain in his middle and ring fingers. He was seen at the [place] [address] and has had steroid injections both on the dorsal and ventral aspects of his wrist that did provide some temporary relief. During his last visit he states he was given a provisional diagnosis of carpal tunnel syndrome, though he states he has had previous surgery for carpal tunnel syndrome (30 yrs ago) and his current symptoms are not at all similar to his prior experience. He does endorse some pain in the base of his thumb occasionally, but ne denies waking at night with numbness in his hands, he denies any weakness in his hand and he denies any sensory loss. He actually thought his visit today was to have a nerve conduction study, which has not yet been performed. MEDICAL HISTORY:             History reviewed. No pertinent past medical history. History reviewed. No pertinent surgical history. Current Outpatient Medications:     allopurinol (ZYLOPRIM) 100 MG tablet, Take 100 mg by mouth 2 times daily, Disp: , Rfl:     Allergies:  Patient has no known allergies. Social History:   Social History     Tobacco Use   Smoking Status Never Smoker   Smokeless Tobacco Never Used     Social History     Substance and Sexual Activity   Alcohol Use None         Family History:   History reviewed. No pertinent family history. REVIEW OF SYSTEMS:    Constitutional: Negative. HENT: Negative. Eyes: Negative. Respiratory: Negative.     Cardiovascular: Negative. Gastrointestinal: Negative. Genitourinary: Negative. Musculoskeletal: Positive for joint pain. Skin: Negative. Neurological: Negative. Endo/Heme/Allergies: Negative. Psychiatric/Behavioral: Negative. Review of Systems was obtained by the medical assistant and reviewed by myself. PHYSICAL EXAM:    Vitals:    03/10/21 0902   BP: 133/86   Pulse: 87       Constitutional: Appears well-developed and well-nourished. Eyes  conjunctiva normal.  Pupils react to light  Ear, nose,throat -Normal pinna and tragus, No scars, or lesions over external nose or ears, no obvious atrophy of tongue  Neck- symmetric, trachea midline, no jugular vein distension  Respiration- chest wall symmetric, normal effort without use of accessory muscles  Musculoskeletal  no significant wasting of muscles noted, no bony deformities, symmetric bulk  Extremities- no clubbing, cyanosis or edema  Skin  warm, dry, and intact. No rash,erythema, or pallor.   Psychiatric  mood, affect, and behavior appear normal.       NEUROLOGIC EXAM:    MENTAL STATUS: Alert, oriented, thought content appropriate    CRANIAL NERVES: PERRL, EOMI, symmetric facies, tongue midline    MOTOR:     Right Upper Extremity:    Deltoid: 5/5  Biceps: 5/5  Triceps: 5/5  Wrist Extension: 5/5  Finger Abduction: 5/5  APB: 5/5    Left Upper Extremity:    Deltoid: 5/5  Biceps: 5/5  Triceps: 5/5  Wrist Extension: 5/5  Finger Abduction: 5/5  APB: 5/5    Right Lower Extremity:    Hip Flexion: 5/5  Knee Extension: 5/5  Ankle Plantarflexion: 5/5  Ankle Dorsiflexion: 5/5      Left Lower Extremity:    Hip Flexion: 5/5  Knee Extension: 5/5  Ankle Plantarflexion: 5/5  Ankle Dorsiflexion: 5/5      SOMATOSENSORY:     Right Upper Extremity: normal pin prick sensation and normal light touch sensation  Left Upper Extremity: normal pin prick sensation and normal light touch sensation  Right Lower Extremity: normal light touch sensation  Left Lower Extremity: normal light touch sensation      REFLEXES:    Biceps: 2+  Patella: 2+    Jane's: Negative      COORDINATION and GAIT:      Gait: Normal        ASSESSMENT AND PLAN:  This is a 61 y.o. male who presents for a second opinion regarding pain and intermittent numbness and tingling in his left hand. He has been given a provisional diagnosis of carpal tunnel syndrome at the [place] [address], however his symptoms do not appear consistent with carpal tunnel syndrome in that the majority of his pain is in the dorsal aspect of his wrist and only involves the ring and middle fingers. He is interested in proceeding with a nerve conduction study to formally exclude carpal tunnel syndrome as a diagnosis and this seems reasonable. If carpal tunnel syndrome can be excluded as the cause of his symptoms, he may benefit from referral to another hand/wrist specialist to discuss his symptoms further.             Delano Dickson MD

## 2021-03-18 ENCOUNTER — HOSPITAL ENCOUNTER (OUTPATIENT)
Dept: NEUROLOGY | Age: 61
Discharge: HOME | End: 2021-03-18
Payer: COMMERCIAL

## 2021-03-18 PROCEDURE — 95886 MUSC TEST DONE W/N TEST COMP: CPT | Performed by: PSYCHIATRY & NEUROLOGY

## 2021-03-18 PROCEDURE — 95886 MUSC TEST DONE W/N TEST COMP: CPT

## 2021-03-18 PROCEDURE — 95909 NRV CNDJ TST 5-6 STUDIES: CPT | Performed by: PSYCHIATRY & NEUROLOGY

## 2021-03-18 PROCEDURE — 95908 NRV CNDJ TST 3-4 STUDIES: CPT

## 2021-03-24 ENCOUNTER — OFFICE VISIT (OUTPATIENT)
Dept: NEUROSURGERY | Age: 61
End: 2021-03-24
Payer: COMMERCIAL

## 2021-03-24 VITALS
HEART RATE: 99 BPM | BODY MASS INDEX: 32.93 KG/M2 | WEIGHT: 230 LBS | DIASTOLIC BLOOD PRESSURE: 81 MMHG | HEIGHT: 70 IN | SYSTOLIC BLOOD PRESSURE: 134 MMHG

## 2021-03-24 DIAGNOSIS — R20.2 NUMBNESS AND TINGLING IN LEFT HAND: Primary | ICD-10-CM

## 2021-03-24 DIAGNOSIS — R20.0 NUMBNESS AND TINGLING IN LEFT HAND: Primary | ICD-10-CM

## 2021-03-24 PROCEDURE — 99214 OFFICE O/P EST MOD 30 MIN: CPT | Performed by: NEUROLOGICAL SURGERY

## 2021-03-24 NOTE — PROGRESS NOTES
NEUROSURGERY FOLLOW UP      Chief Complaint:   Chief Complaint   Patient presents with    Follow-up     numbness/ tingling in left hand. Interval Update:  Planned follow-up after EMG/NCS. His complaints are unchanged. He continues to describe pain in the dorsal aspect of his left hand radiating to right ring and middle fingers with use of his right hand. He continues to deny any numbness on the palmar surface of his hand and he denies any weakness in his hands. He has a history of previous right-sided carpal tunnel release and he states his current symptoms are very different from the symptoms that led to his prior surgery. He did complete the EMG/NCS of his left hand and only mild left-sided median nerve entrapment was noted. HPI:    The patient is a 61 y.o. male who presents for a second opinion regarding intermittent pain, numbness and tingling in his left hand. He states this began suddenly when he injured his hand starting a chainsaw some time ago. He was pulling the starter and felt a pop on the dorsal surface of his wrist and noted immediate pain in his middle and ring fingers. He was seen at the [place] [address] and has had steroid injections both on the dorsal and ventral aspects of his wrist that did provide some temporary relief. During his last visit he states he was given a provisional diagnosis of carpal tunnel syndrome, though he states he has had previous surgery for carpal tunnel syndrome (30 yrs ago) and his current symptoms are not at all similar to his prior experience. He does endorse some pain in the base of his thumb occasionally, but ne denies waking at night with numbness in his hands, he denies any weakness in his hand and he denies any sensory loss. He actually thought his visit today was to have a nerve conduction study, which has not yet been performed. ROS:    Constitutional: Negative. HENT: Negative. Eyes: Negative. Respiratory: Negative. Cardiovascular: Negative. Gastrointestinal: Negative. Genitourinary: Negative. Musculoskeletal: Negative. Skin: Negative. Neurological: Negative. Endo/Heme/Allergies: Negative. Psychiatric/Behavioral: Negative. Review of systems was obtained by the medical assistant and reviewed by myself. Objective:    /81   Pulse 99   Ht 5' 10\" (1.778 m)   Wt 230 lb (104.3 kg)   BMI 33.00 kg/m²         Physical Exam:    General: alert, cooperative, no distress  Cardiorespiratory: unlabored breathing      Neurologic Exam:    Mental Status: Alert, oriented, thought content appropriate  Cranial Nerves: PERRL, EOMI, symmetric facies, tongue midline  Motor: Motor exam is symmetrical 5 out of 5 all extremities bilaterally. APB/OPB strength is 5/5 bilaterally. Somatosensory: normal light touch sensation, no loss of light touch or pinprick sensation in the left thumb or index finger. Assessment and Plan:  62 y/o M returns for follow up of ongoing pain and paresthesias in the dorsal aspect of his left hand, middle and ring fingers following an injury starting a chainsaw. I reviewed his EMG/NCS and advised him that his symptoms are not at all consistent with carpal tunnel syndrome and I would not recommend any surgery at this time. I recommended that he continue to follow up with Dr. James Lee at the [place] [address]. He may follow up with me on an as-needed basis.         Electronically signed by Juliette Richardson MD on 3/24/2021 at 10:01 AM

## 2022-01-12 DIAGNOSIS — Z11.59 SCREENING FOR VIRAL DISEASE: Primary | ICD-10-CM

## 2022-01-17 DIAGNOSIS — Z11.59 SCREENING FOR VIRAL DISEASE: Primary | ICD-10-CM

## 2022-01-21 DIAGNOSIS — Z11.59 SCREENING FOR VIRAL DISEASE: ICD-10-CM

## 2022-01-21 LAB — SARS-COV-2, PCR: DETECTED

## 2022-01-22 NOTE — PROGRESS NOTES
Contacted patient to inform him of positive pre-procedure COVID-19 result. Patient stated that he had seen the result and that he felt bad yesterday, but is feeling better today. Instructed patient that he will not need to do bowel prep this weekend and that he should contact Dr. Pilo Justice' office to reschedule- it will need to be 30 or more days after his positive result. Instructed patient to contact PCP or ER if needed. Patient voiced understanding.

## 2022-02-25 ENCOUNTER — ANESTHESIA EVENT (OUTPATIENT)
Dept: OPERATING ROOM | Age: 62
End: 2022-02-25

## 2022-02-28 ENCOUNTER — APPOINTMENT (OUTPATIENT)
Dept: OPERATING ROOM | Age: 62
End: 2022-02-28

## 2022-02-28 ENCOUNTER — HOSPITAL ENCOUNTER (OUTPATIENT)
Age: 62
Setting detail: SPECIMEN
Discharge: HOME | End: 2022-02-28
Payer: COMMERCIAL

## 2022-02-28 ENCOUNTER — HOSPITAL ENCOUNTER (OUTPATIENT)
Age: 62
Setting detail: OUTPATIENT SURGERY
Discharge: HOME | End: 2022-02-28
Attending: INTERNAL MEDICINE | Admitting: INTERNAL MEDICINE
Payer: COMMERCIAL

## 2022-02-28 ENCOUNTER — ANESTHESIA (OUTPATIENT)
Dept: OPERATING ROOM | Age: 62
End: 2022-02-28

## 2022-02-28 VITALS
SYSTOLIC BLOOD PRESSURE: 123 MMHG | RESPIRATION RATE: 18 BRPM | HEART RATE: 77 BPM | BODY MASS INDEX: 32.21 KG/M2 | WEIGHT: 225 LBS | TEMPERATURE: 97 F | OXYGEN SATURATION: 99 % | HEIGHT: 70 IN | DIASTOLIC BLOOD PRESSURE: 85 MMHG

## 2022-02-28 VITALS — OXYGEN SATURATION: 98 % | DIASTOLIC BLOOD PRESSURE: 25 MMHG | SYSTOLIC BLOOD PRESSURE: 53 MMHG

## 2022-02-28 PROCEDURE — 88305 TISSUE EXAM BY PATHOLOGIST: CPT

## 2022-02-28 PROCEDURE — 45380 COLONOSCOPY AND BIOPSY: CPT

## 2022-02-28 PROCEDURE — G8907 PT DOC NO EVENTS ON DISCHARG: HCPCS

## 2022-02-28 PROCEDURE — 45380 COLONOSCOPY AND BIOPSY: CPT | Performed by: INTERNAL MEDICINE

## 2022-02-28 PROCEDURE — G8918 PT W/O PREOP ORDER IV AB PRO: HCPCS

## 2022-02-28 RX ORDER — PROPOFOL 10 MG/ML
INJECTION, EMULSION INTRAVENOUS PRN
Status: DISCONTINUED | OUTPATIENT
Start: 2022-02-28 | End: 2022-02-28 | Stop reason: SDUPTHER

## 2022-02-28 RX ORDER — LIDOCAINE HYDROCHLORIDE 10 MG/ML
INJECTION, SOLUTION INFILTRATION; PERINEURAL PRN
Status: DISCONTINUED | OUTPATIENT
Start: 2022-02-28 | End: 2022-02-28 | Stop reason: SDUPTHER

## 2022-02-28 RX ORDER — SODIUM CHLORIDE 9 MG/ML
INJECTION, SOLUTION INTRAVENOUS CONTINUOUS
Status: DISCONTINUED | OUTPATIENT
Start: 2022-02-28 | End: 2022-02-28 | Stop reason: HOSPADM

## 2022-02-28 RX ORDER — PANTOPRAZOLE SODIUM 40 MG/1
40 TABLET, DELAYED RELEASE ORAL
Qty: 60 TABLET | Refills: 3 | Status: SHIPPED | OUTPATIENT
Start: 2022-02-28 | End: 2022-05-25 | Stop reason: SDUPTHER

## 2022-02-28 RX ADMIN — SODIUM CHLORIDE: 9 INJECTION, SOLUTION INTRAVENOUS at 10:15

## 2022-02-28 RX ADMIN — PROPOFOL 300 MG: 10 INJECTION, EMULSION INTRAVENOUS at 11:26

## 2022-02-28 RX ADMIN — LIDOCAINE HYDROCHLORIDE 40 MG: 10 INJECTION, SOLUTION INFILTRATION; PERINEURAL at 11:26

## 2022-02-28 NOTE — H&P
Patient Name: Hayden Saunders  : 1960  MRN: [id number]  DATE: 22    Allergies: No Known Allergies     ENDOSCOPY  History and Physical    Procedure:    [] Diagnostic Colonoscopy       [x] Screening Colonoscopy  [] EGD      [] ERCP      [] EUS       [] Other    [x] Previous office notes/History and Physical reviewed from the patients chart. Please see EMR for further details of HPI. I have examined the patient's status immediately prior to the procedure and:      Indications/HPI:       [x] Screening              [x] History of Polyps      [x]Fhx of colon CA/polyps []+Cologard/DNA/Stool Testing      Anesthesia:   [x] MAC [] Moderate Sedation   [] General   [] None     ROS: 12 pt review of systems was negative unless stated above    Medications:   Prior to Admission medications    Medication Sig Start Date End Date Taking? Authorizing Provider   diclofenac (VOLTAREN) 50 MG EC tablet TAKE ONE TABLET BY MOUTH TWICE DAILY 21   Historical Provider, MD   allopurinol (ZYLOPRIM) 100 MG tablet Take 100 mg by mouth 2 times daily    Historical Provider, MD       Past Medical History:  History reviewed. No pertinent past medical history. Past Surgical History:  History reviewed. No pertinent surgical history.     Social History:  Social History     Tobacco Use    Smoking status: Never Smoker    Smokeless tobacco: Never Used   Vaping Use    Vaping Use: Never used   Substance Use Topics    Alcohol use: Never    Drug use: Never       Vital Signs:   Vitals:    22 1002   BP: 125/70   Pulse: 77   Resp: 18   Temp: 97.1 °F (36.2 °C)   SpO2: 98%        Physical Exam:  Cardiac:  [x]WNL []Comments:  Pulmonary:  [x]WNL   []Comments:  Neuro/Mental Status:  [x]WNL  []Comments:  Abdominal:  [x]WNL    []Comments:  Other:   []WNL  []Comments:    Informed Consent:  The risks and benefits of the procedure have been discussed with either the patient or if they cannot consent, their representative. Assessment:  Patient examined and appropriate for planned sedation and procedure. Plan:  Proceed with planned sedation and procedure as above. Khushboo Rahman am scribing for and in the presence of Dr. Mine Bailey MD.  Electronically signed by Nicolas Colón RN on 2/28/2022 at 10:26 AM    I personally performed the services described in this documentation as scribed by Jammie Reece, and it appears accurate and complete.      Mine Bailey MD  2/28/2022

## 2022-02-28 NOTE — OP NOTE
Patient: Delmy Regan: 1960  [facility] Rec#: [id number] Acc#: [id number]   Primary Care Provider Silvia Mathews. Austin Ruiz DO, DO    Date of Procedure:  2/28/2022    Endoscopist: Dian Chaudhary MD    Referring Provider: Silvia Ruiz DO, DO    Operation Performed: Colonoscopy with biopsy    Indications: Screening- hx of polyps/family hx of colon CA    Anesthesia:  Sedation was administered by anesthesia who monitored the patient during the procedure. I met with Casandra Urias prior to procedure. We discussed the procedure itself, and I have discussed the risks of endoscopy (including-- but not limited to-- pain, discomfort, bleeding potentially requiring second endoscopic procedure and/or blood transfusion, organ perforation requiring operative repair, damage to organs near the colon, infection, aspiration, cardiopulmonary/allergic reaction), benefits, indications to endoscopy. Additionally, we discussed options other than colonoscopy. The patient expressed understanding. All questions answered. The patient decided to proceed with the procedure. Signed informed consent was placed on the chart. Blood Loss: minimal    Withdrawal time: > 6 min  Bowel Prep: adequate     Complications: no immediate complications    DESCRIPTION OF PROCEDURE:     A time out was performed. After written informed consent was obtained, the patient was placed in the left lateral position. The perianal area was inspected, and a digital rectal exam was performed. A rectal exam was performed: normal tone, no palpable lesions. At this point, a forward viewing Olympus colonoscope was inserted into the anus and carefully advanced to the terminal ileum. The cecum was identified by the ileocecal valve and the appendiceal orifice. The colonoscope was then slowly withdrawn with careful inspection of the mucosa in a linear and circumferential fashion. The scope was retroflexed in the rectum.  Suction was utilized during the procedure to remove as much air as possible from the bowel. The colonoscope was removed from the patient, and the procedure was terminated. Findings are listed below. Findings: In the terminal ileum there was evidence of ulcerations and inflammation- biopsied. The mucosa appeared normal throughout the entire examined colon. There was a healthy and patent anastomosis in the left colon. There was evidence of diverticular disease throughout the left colon. Retroflexion in the rectum was normal and revealed no further abnormalities. Impression:    Ileitis- NSAID induced vs early IBD    Recommendations:  1. Repeat colonoscopy: pending pathology - max of 10 yrs for screening  2. Await biopsy results-you will receive a letter with your results. 3. Minimize NSAID use  4. Follow up in office with GI APRN in 4 weeks  5. PPI daily- RX sent    Findings and recommendations were discussed w/ the patient. A copy of the images was provided. Lokesh Dior am scribing for and in the presence of Dr. Bebo Elizondo MD.  Electronically signed by Mitzi Mendoza RN on 2/28/2022 at 10:26 AM    I personally performed the services described in this documentation as scribed by Padmaja Marquez, and it appears accurate and complete.      Bebo Elizondo MD  2/28/2022

## 2022-02-28 NOTE — ANESTHESIA PRE PROCEDURE
Department of Anesthesiology  Preprocedure Note       Name:  Tl Gee   Age:  64 y.o.  :  1960                                          MRN:  [id number]         Date:  2022      Surgeon: Faviola Hauser):  Clifton Krishnan MD    Procedure: Procedure(s):  COLORECTAL CANCER SCREENING, NOT HIGH RISK    Medications prior to admission:   Prior to Admission medications    Medication Sig Start Date End Date Taking? Authorizing Provider   diclofenac (VOLTAREN) 50 MG EC tablet TAKE ONE TABLET BY MOUTH TWICE DAILY 21   Historical Provider, MD   allopurinol (ZYLOPRIM) 100 MG tablet Take 100 mg by mouth 2 times daily    Historical Provider, MD       Current medications:    Current Facility-Administered Medications   Medication Dose Route Frequency Provider Last Rate Last Admin    0.9 % sodium chloride infusion   IntraVENous Continuous Clifton Krishnan MD        0.9 % sodium chloride infusion   IntraVENous Continuous Clifton Krishnan  mL/hr at 22 1015 New Bag at 22 1015       Allergies:  No Known Allergies    Problem List:    Patient Active Problem List   Diagnosis Code    Numbness and tingling in left hand R20.0, R20.2       Past Medical History:  History reviewed. No pertinent past medical history. Past Surgical History:  History reviewed. No pertinent surgical history.     Social History:    Social History     Tobacco Use    Smoking status: Never Smoker    Smokeless tobacco: Never Used   Substance Use Topics    Alcohol use: Never                                Counseling given: Not Answered      Vital Signs (Current):   Vitals:    22 1002   BP: 125/70   Pulse: 77   Resp: 18   Temp: 97.1 °F (36.2 °C)   TempSrc: Tympanic   SpO2: 98%   Weight: 225 lb (102.1 kg)   Height: 5' 10\" (1.778 m)                                              BP Readings from Last 3 Encounters:   22 125/70   21 134/81   03/10/21 133/86       NPO Status: Time of last liquid consumption: 0430 Time of last solid consumption: 1200                        Date of last liquid consumption: 02/28/22                        Date of last solid food consumption: 02/26/22    BMI:   Wt Readings from Last 3 Encounters:   02/28/22 225 lb (102.1 kg)   03/24/21 230 lb (104.3 kg)   03/10/21 230 lb (104.3 kg)     Body mass index is 32.28 kg/m². CBC: No results found for: WBC, RBC, HGB, HCT, MCV, RDW, PLT    CMP: No results found for: NA, K, CL, CO2, BUN, CREATININE, GFRAA, AGRATIO, LABGLOM, GLUCOSE, GLU, PROT, CALCIUM, BILITOT, ALKPHOS, AST, ALT    POC Tests: No results for input(s): POCGLU, POCNA, POCK, POCCL, POCBUN, POCHEMO, POCHCT in the last 72 hours. Coags: No results found for: PROTIME, INR, APTT    HCG (If Applicable): No results found for: PREGTESTUR, PREGSERUM, HCG, HCGQUANT     ABGs: No results found for: PHART, PO2ART, CWZ9XYB, KCN1SHU, BEART, G7YCTEMN     Type & Screen (If Applicable):  No results found for: LABABO, LABRH    Drug/Infectious Status (If Applicable):  No results found for: HIV, HEPCAB    COVID-19 Screening (If Applicable):   Lab Results   Component Value Date    COVID19 DETECTED 01/21/2022           Anesthesia Evaluation  Patient summary reviewed and Nursing notes reviewed  Airway: Mallampati: II  TM distance: >3 FB   Neck ROM: full  Mouth opening: > = 3 FB Dental: normal exam         Pulmonary:Negative Pulmonary ROS and normal exam                               Cardiovascular:Negative CV ROS  Exercise tolerance: good (>4 METS),                     Neuro/Psych:   Negative Neuro/Psych ROS              GI/Hepatic/Renal: Neg GI/Hepatic/Renal ROS            Endo/Other: Negative Endo/Other ROS                    Abdominal:             Vascular: negative vascular ROS. Other Findings:             Anesthesia Plan      general and TIVA     ASA 2       Induction: intravenous. Anesthetic plan and risks discussed with patient. Plan discussed with CRNA.                   Cristel Lundberg VESTA Gee - CRNA   2/28/2022

## 2022-02-28 NOTE — ANESTHESIA POSTPROCEDURE EVALUATION
Department of Anesthesiology  Postprocedure Note    Patient: Leann Bennett  MRN: [id number]  YOB: 1960  Date of evaluation: 2/28/2022  Time:  11:32 AM     Procedure Summary     Date: 02/28/22 Room / Location: [facility] ENDO 01 / [address]    Anesthesia Start: 1118 Anesthesia Stop:     Procedure: COLORECTAL CANCER SCREENING, NOT HIGH RISK (N/A Abdomen) Diagnosis: (SCREEN, HX POLYPS, FH POLYPS)    Surgeons: Ney Jordan MD Responsible Provider: VESTA Higginbotham - CRNA    Anesthesia Type: general, TIVA ASA Status: 2          Anesthesia Type: No value filed. Tiago Phase I:      Tiago Phase II:      Last vitals: Reviewed and per EMR flowsheets.        Anesthesia Post Evaluation    Patient location during evaluation: bedside  Patient participation: complete - patient participated  Level of consciousness: sleepy but conscious  Pain score: 0  Airway patency: patent  Nausea & Vomiting: no nausea and no vomiting  Complications: no  Cardiovascular status: blood pressure returned to baseline  Respiratory status: acceptable, room air and spontaneous ventilation  Hydration status: euvolemic

## 2022-03-28 ENCOUNTER — OFFICE VISIT (OUTPATIENT)
Dept: GASTROENTEROLOGY | Age: 62
End: 2022-03-28
Payer: COMMERCIAL

## 2022-03-28 VITALS
HEIGHT: 70 IN | SYSTOLIC BLOOD PRESSURE: 136 MMHG | WEIGHT: 233.4 LBS | DIASTOLIC BLOOD PRESSURE: 82 MMHG | HEART RATE: 73 BPM | BODY MASS INDEX: 33.41 KG/M2 | OXYGEN SATURATION: 98 %

## 2022-03-28 DIAGNOSIS — K52.9 ILEITIS: Primary | ICD-10-CM

## 2022-03-28 PROCEDURE — 99213 OFFICE O/P EST LOW 20 MIN: CPT | Performed by: NURSE PRACTITIONER

## 2022-03-28 ASSESSMENT — ENCOUNTER SYMPTOMS
ABDOMINAL PAIN: 0
ANAL BLEEDING: 0
ABDOMINAL DISTENTION: 0
SHORTNESS OF BREATH: 0
COUGH: 0
DIARRHEA: 0
NAUSEA: 0
CONSTIPATION: 0
CHOKING: 0
RECTAL PAIN: 0
VOMITING: 0
TROUBLE SWALLOWING: 0
BLOOD IN STOOL: 0

## 2022-03-28 NOTE — PATIENT INSTRUCTIONS
Patient Education        pantoprazole (oral/injection)  Pronunciation:  pan TOE pra zole  Brand:  Protonix  What is the most important information I should know about pantoprazole? Pantoprazole can cause kidney problems. Tell your doctor if you are urinating less than usual, or if you have blood in your urine. Diarrhea may be a sign of a new infection. Call your doctor if you have diarrhea that is watery or has blood in it. Pantoprazole may cause new or worsening symptoms of lupus. Tell your doctor if you have joint pain and a skin rash on your cheeks or arms that worsens in sunlight. You may be more likely to have a broken bone while taking this medicine long term or more than once per day. What is pantoprazole? Pantoprazole is used to treat erosive esophagitis (damage to the esophagus from stomach acid caused by gastroesophageal reflux disease, or GERD) in adults and children who are at least 11years old. Pantoprazole is usually given for up to 8 weeks at a time while your esophagus heals. Pantoprazole is also used to treat Zollinger-Luke syndrome and other conditions involving excess stomach acid. Pantoprazole is not for immediate relief of heartburn. Pantoprazole may also be used for purposes not listed in this medication guide. What should I discuss with my healthcare provider before using pantoprazole? Heartburn can mimic early symptoms of a heart attack. Get emergency medical help if you have chest pain that spreads to your jaw or shoulder and you feel anxious or light-headed. You should not use this medicine if:  · you also take medicine that contains rilpivirine (Raffi Lockhart, Samantha Nanas);  · if you had breathing problems, kidney problems, or a severe allergic reaction after taking pantoprazole in the past;  · you are allergic to pantoprazole or similar medicines (lansoprazole, omeprazole, Nexium, Prevacid, Prilosec, and others).   Tell your doctor if you have ever had:  · low levels of magnesium in your blood;  · lupus; or  · osteoporosis or low bone mineral density. You may be more likely to have a broken bone in your hip, wrist, or spine while taking a proton pump inhibitor long-term or more than once per day. Talk with your doctor about ways to keep your bones healthy. Tell your doctor if you are pregnant or breastfeeding. Pantoprazole is not approved for use by anyone younger than 11years old. How should I use pantoprazole? Follow all directions on your prescription label and read all medication guides or instruction sheets. Use the medicine exactly as directed. Use the lowest dose for the shortest amount of time needed to treat your condition. Pantoprazole is taken by mouth (oral) or given as an infusion into a vein (injection). A healthcare provider may teach you how to properly use pantoprazole injection by yourself. Pantoprazole tablets are taken by mouth, with or without food. Pantoprazole oral granules should be taken 30 minutes before a meal.  Do not crush, chew, or break the tablet. Swallow it whole. The oral granules should be mixed with applesauce or apple juice and given either by mouth or through a nasogastric (NG) tube. Read and carefully follow any Instructions for Use provided with your medicine. Ask your doctor or pharmacist if you do not understand these instructions. Use this medicine for the full prescribed length of time, even if your symptoms quickly improve. Call your doctor if your symptoms do not improve or if they get worse while you are using this medicine. This medicine can affect the results of certain medical tests. Tell any doctor who treats you that you are using pantoprazole. Pantoprazole may also affect a drug-screening urine test and you may have false results. Tell the laboratory staff that you use this medicine. Store this medicine at room temperature away from moisture, heat, and light. What happens if I miss a dose?   Use the medicine as soon as you can, but skip the missed dose if it is almost time for your next dose. Do not use two doses at one time. What happens if I overdose? Seek emergency medical attention or call the Poison Help line at 1-593.842.5603. What should I avoid while using pantoprazole? This medicine can cause diarrhea, which may be a sign of a new infection. If you have diarrhea that is watery or bloody, call your doctor. Do not use anti-diarrhea medicine unless your doctor tells you to. What are the possible side effects of pantoprazole? Get emergency medical help if you have signs of an allergic reaction: hives; difficulty breathing; swelling of your face, lips, tongue, or throat. Call your doctor at once if you have:  · severe stomach pain, diarrhea that is watery or bloody;  · sudden pain or trouble moving your hip, wrist, or back;  · bruising or swelling where intravenous pantoprazole was injected;  · kidney problems -- fever, rash, nausea, loss of appetite, joint pain, urinating less than usual, blood in your urine, weight gain;  · low magnesium --dizziness, fast or irregular heart rate, tremors (shaking) or jerking muscle movements, feeling jittery, muscle cramps, muscle spasms in your hands and feet, cough or choking feeling; or  · new or worsening symptoms of lupus --joint pain, and a skin rash on your cheeks or arms that worsens in sunlight. Taking pantoprazole long-term may cause you to develop stomach growths called fundic gland polyps. Talk with your doctor about this risk. If you use pantoprazole for longer than 3 years, you could develop a vitamin B-12 deficiency. Talk to your doctor about how to manage this condition if you develop it. Common side effects may include:  · headache, dizziness;  · stomach pain, gas, nausea, vomiting, diarrhea;  · joint pain; or  · fever, rash, or cold symptoms (most common in children). This is not a complete list of side effects and others may occur.  Call your The information contained herein is not intended to cover all possible uses, directions, precautions, warnings, drug interactions, allergic reactions, or adverse effects. If you have questions about the drugs you are taking, check with your doctor, nurse or pharmacist.  Copyright 7591-3020 [address]. Version: 21.01. Revision date: 1/4/2021. Care instructions adapted under license by [facility] ([facility]). If you have questions about a medical condition or this instruction, always ask your healthcare professional. [place] [address] any warranty or liability for your use of this information.

## 2022-03-28 NOTE — PROGRESS NOTES
Subjective:     Patient ID: Erika Schafer is a 64 y.o. male  PCP: Joceline Nash DO, DO  Referring Provider: No ref. provider found    HPI  Patient presents to the office today with the following complaints: Follow-up      Pt following up today after screening colonoscopy on 2/28/22. Ulcerations and inflammation noted in ileum. At time of colonoscopy, this was thought to be NSAID related vs early IBD. Biopsies showed no active inflammation. Pt on Diclofenac daily for hand pain. He was started on Protonix 40 mg BID after Colonoscopy. Pt denies any rectal bleeding, blood in stool, abdominal pain. He reports chronic hx loose stools. Colonoscopy Findings 2/28/22: In the terminal ileum there was evidence of ulcerations and inflammation- biopsied. The mucosa appeared normal throughout the entire examined colon. There was a healthy and patent anastomosis in the left colon. There was evidence of diverticular disease throughout the left colon. Retroflexion in the rectum was normal and revealed no further abnormalities. Impression:  Ileitis- NSAID induced vs early IBD  Recommendations:  1. Repeat colonoscopy: pending pathology - max of 10 yrs for screening  2. Await biopsy results-you will receive a letter with your results. 3. Minimize NSAID use  4. Follow up in office with GI APRN in 4 weeks  5. PPI daily- RX sent    FINAL DIAGNOSIS:   Small intestine, terminal ileum biopsies:   Benign terminal ileum type mucosa with focal reparative change, negative for evidence of active inflammation or dysplasia. All scope and pathology reports were reviewed and discussed with patient. All questions answered, pt verbalized understanding. Assessment:     1.  Ileitis            Plan:   - Continue Protonix at BID dosing x 3-4 months, then daily   - Follow up yearly or sooner if needed   - Call with any questions or concerns   - Minimize NSAID use (Diclofenac)      Orders  No orders of the defined types were placed in this encounter. Medications  No orders of the defined types were placed in this encounter. Patient History:     No past medical history on file. Past Surgical History:   Procedure Laterality Date    COLONOSCOPY N/A 02/28/2022    Dr Con Perez and patent anastomosis in the left colon, diverticulosis, Ileitis- NSAID induced vs early IBD, no active disease, 10 yr recall       Family History   Problem Relation Age of Onset    Colon Cancer Neg Hx     Esophageal Cancer Neg Hx     Liver Cancer Neg Hx     Rectal Cancer Neg Hx     Stomach Cancer Neg Hx        Social History     Socioeconomic History    Marital status:      Spouse name: None    Number of children: None    Years of education: None    Highest education level: None   Occupational History    None   Tobacco Use    Smoking status: Never Smoker    Smokeless tobacco: Never Used   Vaping Use    Vaping Use: Never used   Substance and Sexual Activity    Alcohol use: Never    Drug use: Never    Sexual activity: None   Other Topics Concern    None   Social History Narrative    None     Social Determinants of Health     Financial Resource Strain:     Difficulty of Paying Living Expenses: Not on file   Food Insecurity:     Worried About Running Out of Food in the Last Year: Not on file    Hunter of Food in the Last Year: Not on file   Transportation Needs:     Lack of Transportation (Medical): Not on file    Lack of Transportation (Non-Medical):  Not on file   Physical Activity:     Days of Exercise per Week: Not on file    Minutes of Exercise per Session: Not on file   Stress:     Feeling of Stress : Not on file   Social Connections:     Frequency of Communication with Friends and Family: Not on file    Frequency of Social Gatherings with Friends and Family: Not on file    Attends Anabaptist Services: Not on file    Active Member of Clubs or Organizations: Not on file    Attends Club or Organization Left eye: No discharge. Conjunctiva/sclera: Conjunctivae normal.      Pupils: Pupils are equal, round, and reactive to light. Cardiovascular:      Rate and Rhythm: Normal rate and regular rhythm. Heart sounds: Normal heart sounds. No murmur heard. Pulmonary:      Effort: Pulmonary effort is normal. No respiratory distress. Breath sounds: Normal breath sounds. No wheezing or rales. Abdominal:      General: Bowel sounds are normal. There is no distension. Palpations: Abdomen is soft. There is no mass. Tenderness: There is no abdominal tenderness. There is no guarding or rebound. Musculoskeletal:         General: Normal range of motion. Cervical back: Normal range of motion and neck supple. Skin:     General: Skin is warm and dry. Coloration: Skin is not pale. Neurological:      Mental Status: He is alert and oriented to person, place, and time.    Psychiatric:         Behavior: Behavior normal.

## 2022-05-24 NOTE — TELEPHONE ENCOUNTER
Last visit [address] [facility] aprn 3-28-22. Hx Ileitis. No FU scheduled as to date. CLN  2-28-22. [facility] faxed a RF request for Pantoprazole 40 mg at Bid dosing. Last RF  2-28-22 # 60 x 3.  [facility]

## 2022-05-25 RX ORDER — PANTOPRAZOLE SODIUM 40 MG/1
40 TABLET, DELAYED RELEASE ORAL
Qty: 60 TABLET | Refills: 1 | Status: SHIPPED | OUTPATIENT
Start: 2022-05-25

## 2023-09-07 ENCOUNTER — PRE-ADMISSION TESTING (OUTPATIENT)
Dept: PREADMISSION TESTING | Facility: HOSPITAL | Age: 63
End: 2023-09-07
Payer: COMMERCIAL

## 2023-09-07 VITALS
HEART RATE: 80 BPM | SYSTOLIC BLOOD PRESSURE: 145 MMHG | DIASTOLIC BLOOD PRESSURE: 93 MMHG | RESPIRATION RATE: 18 BRPM | WEIGHT: 225.53 LBS | HEIGHT: 69 IN | BODY MASS INDEX: 33.4 KG/M2 | OXYGEN SATURATION: 99 %

## 2023-09-07 LAB
DEPRECATED RDW RBC AUTO: 41.8 FL (ref 37–54)
ERYTHROCYTE [DISTWIDTH] IN BLOOD BY AUTOMATED COUNT: 12.4 % (ref 12.3–15.4)
HCT VFR BLD AUTO: 47.3 % (ref 37.5–51)
HGB BLD-MCNC: 15.7 G/DL (ref 13–17.7)
MCH RBC QN AUTO: 30.5 PG (ref 26.6–33)
MCHC RBC AUTO-ENTMCNC: 33.2 G/DL (ref 31.5–35.7)
MCV RBC AUTO: 91.8 FL (ref 79–97)
PLATELET # BLD AUTO: 201 10*3/MM3 (ref 140–450)
PMV BLD AUTO: 9.7 FL (ref 6–12)
RBC # BLD AUTO: 5.15 10*6/MM3 (ref 4.14–5.8)
WBC NRBC COR # BLD: 5.32 10*3/MM3 (ref 3.4–10.8)

## 2023-09-07 PROCEDURE — 36415 COLL VENOUS BLD VENIPUNCTURE: CPT

## 2023-09-07 PROCEDURE — 85027 COMPLETE CBC AUTOMATED: CPT

## 2023-09-14 NOTE — DISCHARGE INSTRUCTIONS
Lower Extremity Post-op Instructions  Dr. MCNEIL        POST-OP CARE: Please follow these instructions closely!    IMPORTANT PHONE NUMBERS:  For emergencies, please call [phone]  You may reach Dr. Mcneil or his medical assistants at [phone], M-F 8:0am-5:00pm  After 5pm or on the weekends, please call the answering service which can be reached from the number above   Call immediately if you have any of the following symptoms:  Elevated temperature above 101.5 degrees for more than 48 hours after surgery  Persistent drainage  from wound  Severe pain around surgical site  Calf pain    Weight Bearing:   _____ Weight Bearing as tolerated (with or without crutches)   _____ Touch-Toe Weight-bearing    _____ Partial Weight Bearing  ___ % for ___ weeks (must use crutches)   __X___ HEEL walking only x 4 weeks    Bathing:  If stated below, it is ok to remove your postop dressing and shower.  DO NOT SOAK the incision in water.  Pat the incision site dry after surgery  _X__ You may remove your dressing and shower on the 3rd day following surgery; if you shower before this, please cover your incision thoroughly  ___Keep your splint/dressing clean, dry, intact.  Do not place foreign objects inside the splint/dressing.    Dressings: Do NOT remove dressing/splint unless unless told to do so. SOME DRAINAGE IS NORMAL!  If you have a splint or cast, do NOT get wet!!    DO NOT touch, remove, or apply ointment to the incision and/or steri strips  Steri strips may fall off on their own  Signs of infection that warrant a phone call to our clinical line:  Excessive drainage or redness  Red streaking coming away from the incision  Increased pain  Increased temperature above 101 degrees    Sutures:  If your physician uses sutures in your knee or ankle, they will dissolve on their own and will not need to be removed.  Black sutures occasionally used will need to be removed 10-14 days after surgery.    Elevate: Place 2 pillows under your ankle  to get the incision area above the level of the heart to help in swelling (a recliner is not elevated!!!)    Ice: Ice your surgery site 5-6 times per day for 20 minutes at a time with dressing in place. You should wait at least 30 minutes before icing again to avoid ice irritation. It may be difficult at first to ice the surgery area due to the amount of dressing, but continue to be diligent with icing.  Your dressings will be taken down at your first post-op appointment.     Range of motion:   For the knee- It is important to gain gain full extension (knee straight) as soon as possible following surgery.         Ice to decrease swelling --> Knee fully straight --> Walk without a limp!!!  NO PILLOWS UNDER THE KNEE, ONLY under the ankle  For the foot/ankle- range of motion restrictions will be given to you at your first post-op appointment. Until that time, avoid any unnecessary range o f motion.  Physical therapy- Your physical therapy status will be discussed with you at your first post-op appointment.   **Achieving range of motion goals and decreasing swelling/inflammation are the primary focus for the first two (2) weeks following surgery. **    Medications: You will be discharged with the appropriate medications following your surgery. Fill these at the pharmacy and take them as directed on the label.   Possible medications that will be prescribed are below.  You may or may not receive all of these. Occasionally, additional medications may be given with specific instructions.  Percocet/Lortab (oxycodone/hydrocodone with tylenol) - Pain Medication.  Take one tablet every 4-6 hours. DO NOT EXCEED 4,000mg of Tylenol in 24 hours.        **Itching is not an allergy - take benadryl or an over the counter allergy medication (claritin, Zyrtec) if needed  **DO NOT MIX WITH ALCOHOL, DRIVE WHILE TAKING, OR TAKE EXTRA TYLENOL*   Zofran - Anti-nausea medication to help prevent nausea and vomiting after                              surgery.     Aspirin 81 mg - all patients with lower extremity surgery should take one aspirin                            81 mg for 17 days after surgery    DO NOT TAKE NSAID'S (ex. IBUPROFEN, MOTRIN, ALEVE, ETC) AFTER A BROKEN BONE HAS BEEN REPAIRED OR AFTER ACL SURGERY - THESE MEDICATIONS WILL SLOW THE HEALING PROCESS!!!    **Starting January 2021, all narcotic medication must be prescribed electronically to your pharmacy.  Be sure to notify nursing of your preferred pharmacy.      **If you are running low on pain medications, please notify us if you need a refill 24-48 hours prior to when you run out, so we can make arrangements to refill the prescription for you if we determine it is necessary**

## 2023-09-17 PROBLEM — M19.071 OSTEOARTHRITIS OF FIRST METATARSOPHALANGEAL (MTP) JOINT OF RIGHT FOOT: Status: ACTIVE | Noted: 2023-09-17

## 2023-09-17 NOTE — OP NOTE
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     IMPLANTS: Arthrex.     ANESTHESIA USED: General endotracheal anesthesia.     OPERATIVE INDICATIONS: This patient is a 63 y.o. male who presented to my clinic with pain overlying the great toe MTP joint. Radiographs confirmed severe arthritic change of this joint. We attempted conservative treatment with injections, anti-inflammatories, as well as changing shoes. All these failed to improve symptoms long term. After failing conservative treatment, the patient wished to proceed with surgery. The surgery of choice given the amount of arthritic change was that of a fusion of the 1st MTP joint, and the patient understood the risks, benefits and alternatives. The risks included but were not limited to that of anesthesia, bleeding, infection, pain, damage to local structures, need for further surgery, malunion, nonunion, hardware failure, loss of function.     ESTIMATED BLOOD LOSS: Less than 10 mL     SPECIMENS: None.     DRAINS: None.     COMPLICATIONS: None.     PROCEDURE IN DETAIL: The patient was seen in the preoperative holding room; once again the informed consent form was reviewed with the patient and signed. The site of surgery was marked with her agreement. The patient was transported to the operating room where a timeout was performed identifying the correct patient as well as the operative site. Two grams of IV Kefzol was given as perioperative antibiotics. The right lower extremity was prepped and draped in usual sterile fashion. Tourniquet was placed about the right thigh, inflated to 250  mmHg, and total tourniquet time was less than 1 hour.     A midline incision was made overlying the great toe just medial to the extensor hallucis longus. This tendon was retracted laterally. The joint capsule was then opened. The capsule was released from the joint both proximally and distally. Again note was severe arthritic change involving the MTP joint. Once prominent osteophytes were removed, a 1.6 mm guidepin was then placed into the central aspect of the metatarsal in retrograde fashion with hyperflexion of the proximal phalanx. Over this guidewire, a precontoured reamer, which was convex in fashion, was used to drill down to subchondral, bleeding bone. The K-wire was removed and multiple holes were placed within this surface in order to promote healing. A cylindrical reamer was then placed overlying the metatarsal head in order to remove any further spurring which was present.     Attention was then turned to the base of the proximal phalanx which was the prepared for its fusion site. Again, a 1.6 mm K-wire was placed centrally, followed by an appropriate convex reamer. The joint was then compressed and while maintaining its reduction, an Arthrex MTP fusion plate was applied to the dorsal surface of the bone. Locking screws were placed distally followed by cortical screws proximally in a compression technique. C-arm images were used in multiple planes showing that the hardware was appropriate and that the fusion site was well aligned.    The incision was irrigated, followed by closure in layers, and the skin was closed with adhesive glue. A well-padded dressing was then applied, followed by a hard-soled shoe. The patient was awakened from anesthesia, transported to the recovery room in stable condition.     POSTOPERATIVE PLAN: Discharge home with family, follow up in 2 weeks for a clinical check. The patient may be heel walking immediately.    Electronically signed by Jian Mcneal MD on 9/19/2023  at 12:32 CDT

## 2023-09-19 ENCOUNTER — ANESTHESIA (OUTPATIENT)
Dept: PERIOP | Facility: HOSPITAL | Age: 63
End: 2023-09-19
Payer: COMMERCIAL

## 2023-09-19 ENCOUNTER — APPOINTMENT (OUTPATIENT)
Dept: GENERAL RADIOLOGY | Facility: HOSPITAL | Age: 63
End: 2023-09-19

## 2023-09-19 ENCOUNTER — HOSPITAL ENCOUNTER (OUTPATIENT)
Facility: HOSPITAL | Age: 63
Setting detail: HOSPITAL OUTPATIENT SURGERY
Discharge: HOME | End: 2023-09-19
Attending: ORTHOPAEDIC SURGERY | Admitting: ORTHOPAEDIC SURGERY

## 2023-09-19 ENCOUNTER — ANESTHESIA EVENT (OUTPATIENT)
Dept: PERIOP | Facility: HOSPITAL | Age: 63
End: 2023-09-19
Payer: COMMERCIAL

## 2023-09-19 VITALS
HEART RATE: 70 BPM | SYSTOLIC BLOOD PRESSURE: 131 MMHG | OXYGEN SATURATION: 95 % | RESPIRATION RATE: 16 BRPM | TEMPERATURE: 97.7 F | DIASTOLIC BLOOD PRESSURE: 74 MMHG

## 2023-09-19 DIAGNOSIS — M19.071 OSTEOARTHRITIS OF FIRST METATARSOPHALANGEAL (MTP) JOINT OF RIGHT FOOT: Primary | ICD-10-CM

## 2023-09-19 PROCEDURE — 25010000002 DEXAMETHASONE PER 1 MG: Performed by: NURSE ANESTHETIST, CERTIFIED REGISTERED

## 2023-09-19 PROCEDURE — C1713 ANCHOR/SCREW BN/BN,TIS/BN: HCPCS | Performed by: ORTHOPAEDIC SURGERY

## 2023-09-19 PROCEDURE — 25010000002 ONDANSETRON PER 1 MG: Performed by: NURSE ANESTHETIST, CERTIFIED REGISTERED

## 2023-09-19 PROCEDURE — 25010000002 FENTANYL CITRATE (PF) 50 MCG/ML SOLUTION: Performed by: ANESTHESIOLOGY

## 2023-09-19 PROCEDURE — 73620 X-RAY EXAM OF FOOT: CPT

## 2023-09-19 PROCEDURE — C1769 GUIDE WIRE: HCPCS | Performed by: ORTHOPAEDIC SURGERY

## 2023-09-19 PROCEDURE — 25010000002 DEXAMETHASONE PER 1 MG: Performed by: ANESTHESIOLOGY

## 2023-09-19 PROCEDURE — 25010000002 CEFAZOLIN PER 500 MG: Performed by: ORTHOPAEDIC SURGERY

## 2023-09-19 PROCEDURE — 25010000002 PROPOFOL 10 MG/ML EMULSION: Performed by: NURSE ANESTHETIST, CERTIFIED REGISTERED

## 2023-09-19 PROCEDURE — 76000 FLUOROSCOPY <1 HR PHYS/QHP: CPT

## 2023-09-19 RX ORDER — DROPERIDOL 2.5 MG/ML
0.62 INJECTION, SOLUTION INTRAMUSCULAR; INTRAVENOUS ONCE AS NEEDED
Status: DISCONTINUED | OUTPATIENT
Start: 2023-09-19 | End: 2023-09-19 | Stop reason: HOSPADM

## 2023-09-19 RX ORDER — SODIUM CHLORIDE 0.9 % (FLUSH) 0.9 %
10 SYRINGE (ML) INJECTION AS NEEDED
Status: DISCONTINUED | OUTPATIENT
Start: 2023-09-19 | End: 2023-09-19 | Stop reason: HOSPADM

## 2023-09-19 RX ORDER — SODIUM CHLORIDE 9 MG/ML
40 INJECTION, SOLUTION INTRAVENOUS AS NEEDED
Status: DISCONTINUED | OUTPATIENT
Start: 2023-09-19 | End: 2023-09-19 | Stop reason: HOSPADM

## 2023-09-19 RX ORDER — LIDOCAINE HYDROCHLORIDE 10 MG/ML
0.5 INJECTION, SOLUTION EPIDURAL; INFILTRATION; INTRACAUDAL; PERINEURAL ONCE AS NEEDED
Status: DISCONTINUED | OUTPATIENT
Start: 2023-09-19 | End: 2023-09-19 | Stop reason: HOSPADM

## 2023-09-19 RX ORDER — SODIUM CHLORIDE, SODIUM LACTATE, POTASSIUM CHLORIDE, CALCIUM CHLORIDE 600; 310; 30; 20 MG/100ML; MG/100ML; MG/100ML; MG/100ML
9 INJECTION, SOLUTION INTRAVENOUS CONTINUOUS
Status: DISCONTINUED | OUTPATIENT
Start: 2023-09-19 | End: 2023-09-19 | Stop reason: HOSPADM

## 2023-09-19 RX ORDER — IBUPROFEN 600 MG/1
600 TABLET ORAL ONCE AS NEEDED
Status: DISCONTINUED | OUTPATIENT
Start: 2023-09-19 | End: 2023-09-19 | Stop reason: HOSPADM

## 2023-09-19 RX ORDER — ACETAMINOPHEN 500 MG
1000 TABLET ORAL ONCE
Status: COMPLETED | OUTPATIENT
Start: 2023-09-19 | End: 2023-09-19

## 2023-09-19 RX ORDER — ONDANSETRON 2 MG/ML
INJECTION INTRAMUSCULAR; INTRAVENOUS AS NEEDED
Status: DISCONTINUED | OUTPATIENT
Start: 2023-09-19 | End: 2023-09-19 | Stop reason: SURG

## 2023-09-19 RX ORDER — HYDROCODONE BITARTRATE AND ACETAMINOPHEN 10; 325 MG/1; MG/1
1 TABLET ORAL EVERY 4 HOURS PRN
Status: DISCONTINUED | OUTPATIENT
Start: 2023-09-19 | End: 2023-09-19 | Stop reason: HOSPADM

## 2023-09-19 RX ORDER — SCOLOPAMINE TRANSDERMAL SYSTEM 1 MG/1
1 PATCH, EXTENDED RELEASE TRANSDERMAL ONCE
Status: DISCONTINUED | OUTPATIENT
Start: 2023-09-19 | End: 2023-09-19 | Stop reason: HOSPADM

## 2023-09-19 RX ORDER — HYDROCODONE BITARTRATE AND ACETAMINOPHEN 5; 325 MG/1; MG/1
1 TABLET ORAL ONCE AS NEEDED
Status: DISCONTINUED | OUTPATIENT
Start: 2023-09-19 | End: 2023-09-19 | Stop reason: HOSPADM

## 2023-09-19 RX ORDER — FENTANYL CITRATE 50 UG/ML
25 INJECTION, SOLUTION INTRAMUSCULAR; INTRAVENOUS
Status: DISCONTINUED | OUTPATIENT
Start: 2023-09-19 | End: 2023-09-19 | Stop reason: HOSPADM

## 2023-09-19 RX ORDER — FLUMAZENIL 0.1 MG/ML
0.2 INJECTION INTRAVENOUS AS NEEDED
Status: DISCONTINUED | OUTPATIENT
Start: 2023-09-19 | End: 2023-09-19 | Stop reason: HOSPADM

## 2023-09-19 RX ORDER — OXYCODONE AND ACETAMINOPHEN 10; 325 MG/1; MG/1
1 TABLET ORAL EVERY 6 HOURS PRN
Qty: 20 TABLET | Refills: 0 | Status: SHIPPED | OUTPATIENT
Start: 2023-09-19

## 2023-09-19 RX ORDER — SODIUM CHLORIDE 0.9 % (FLUSH) 0.9 %
10 SYRINGE (ML) INJECTION EVERY 12 HOURS SCHEDULED
Status: DISCONTINUED | OUTPATIENT
Start: 2023-09-19 | End: 2023-09-19 | Stop reason: HOSPADM

## 2023-09-19 RX ORDER — DEXAMETHASONE SODIUM PHOSPHATE 4 MG/ML
INJECTION, SOLUTION INTRA-ARTICULAR; INTRALESIONAL; INTRAMUSCULAR; INTRAVENOUS; SOFT TISSUE AS NEEDED
Status: DISCONTINUED | OUTPATIENT
Start: 2023-09-19 | End: 2023-09-19 | Stop reason: SURG

## 2023-09-19 RX ORDER — MIDAZOLAM HYDROCHLORIDE 1 MG/ML
1 INJECTION INTRAMUSCULAR; INTRAVENOUS
Status: DISCONTINUED | OUTPATIENT
Start: 2023-09-19 | End: 2023-09-19 | Stop reason: HOSPADM

## 2023-09-19 RX ORDER — SODIUM CHLORIDE, SODIUM LACTATE, POTASSIUM CHLORIDE, CALCIUM CHLORIDE 600; 310; 30; 20 MG/100ML; MG/100ML; MG/100ML; MG/100ML
100 INJECTION, SOLUTION INTRAVENOUS CONTINUOUS PRN
Status: DISCONTINUED | OUTPATIENT
Start: 2023-09-19 | End: 2023-09-19 | Stop reason: HOSPADM

## 2023-09-19 RX ORDER — ONDANSETRON 2 MG/ML
4 INJECTION INTRAMUSCULAR; INTRAVENOUS ONCE AS NEEDED
Status: DISCONTINUED | OUTPATIENT
Start: 2023-09-19 | End: 2023-09-19 | Stop reason: HOSPADM

## 2023-09-19 RX ORDER — EPHEDRINE SULFATE 50 MG/ML
INJECTION, SOLUTION INTRAVENOUS AS NEEDED
Status: DISCONTINUED | OUTPATIENT
Start: 2023-09-19 | End: 2023-09-19 | Stop reason: SURG

## 2023-09-19 RX ORDER — ONDANSETRON 4 MG/1
4 TABLET, FILM COATED ORAL EVERY 8 HOURS PRN
Qty: 10 TABLET | Refills: 0 | Status: SHIPPED | OUTPATIENT
Start: 2023-09-19

## 2023-09-19 RX ORDER — DEXTROSE MONOHYDRATE 25 G/50ML
12.5 INJECTION, SOLUTION INTRAVENOUS AS NEEDED
Status: DISCONTINUED | OUTPATIENT
Start: 2023-09-19 | End: 2023-09-19 | Stop reason: HOSPADM

## 2023-09-19 RX ORDER — LABETALOL HYDROCHLORIDE 5 MG/ML
5 INJECTION, SOLUTION INTRAVENOUS
Status: DISCONTINUED | OUTPATIENT
Start: 2023-09-19 | End: 2023-09-19 | Stop reason: HOSPADM

## 2023-09-19 RX ORDER — LIDOCAINE HYDROCHLORIDE 20 MG/ML
INJECTION, SOLUTION EPIDURAL; INFILTRATION; INTRACAUDAL; PERINEURAL AS NEEDED
Status: DISCONTINUED | OUTPATIENT
Start: 2023-09-19 | End: 2023-09-19 | Stop reason: SURG

## 2023-09-19 RX ORDER — DEXAMETHASONE SODIUM PHOSPHATE 4 MG/ML
4 INJECTION, SOLUTION INTRA-ARTICULAR; INTRALESIONAL; INTRAMUSCULAR; INTRAVENOUS; SOFT TISSUE ONCE AS NEEDED
Status: COMPLETED | OUTPATIENT
Start: 2023-09-19 | End: 2023-09-19

## 2023-09-19 RX ORDER — PANTOPRAZOLE SODIUM 40 MG/1
40 TABLET, DELAYED RELEASE ORAL DAILY
COMMUNITY

## 2023-09-19 RX ORDER — NALOXONE HCL 0.4 MG/ML
0.4 VIAL (ML) INJECTION AS NEEDED
Status: DISCONTINUED | OUTPATIENT
Start: 2023-09-19 | End: 2023-09-19 | Stop reason: HOSPADM

## 2023-09-19 RX ORDER — MAGNESIUM HYDROXIDE 1200 MG/15ML
LIQUID ORAL AS NEEDED
Status: DISCONTINUED | OUTPATIENT
Start: 2023-09-19 | End: 2023-09-19 | Stop reason: HOSPADM

## 2023-09-19 RX ORDER — PROPOFOL 10 MG/ML
VIAL (ML) INTRAVENOUS AS NEEDED
Status: DISCONTINUED | OUTPATIENT
Start: 2023-09-19 | End: 2023-09-19 | Stop reason: SURG

## 2023-09-19 RX ADMIN — FENTANYL CITRATE 50 MCG: 50 INJECTION, SOLUTION INTRAMUSCULAR; INTRAVENOUS at 11:28

## 2023-09-19 RX ADMIN — CEFAZOLIN 2000 MG: 2 INJECTION, POWDER, FOR SOLUTION INTRAMUSCULAR; INTRAVENOUS at 11:28

## 2023-09-19 RX ADMIN — PROPOFOL 80 MG: 10 INJECTION, EMULSION INTRAVENOUS at 11:24

## 2023-09-19 RX ADMIN — DEXAMETHASONE SODIUM PHOSPHATE 4 MG: 4 INJECTION, SOLUTION INTRA-ARTICULAR; INTRALESIONAL; INTRAMUSCULAR; INTRAVENOUS; SOFT TISSUE at 11:44

## 2023-09-19 RX ADMIN — FENTANYL CITRATE 25 MCG: 50 INJECTION, SOLUTION INTRAMUSCULAR; INTRAVENOUS at 12:45

## 2023-09-19 RX ADMIN — SODIUM CHLORIDE, POTASSIUM CHLORIDE, SODIUM LACTATE AND CALCIUM CHLORIDE 100 ML/HR: 600; 310; 30; 20 INJECTION, SOLUTION INTRAVENOUS at 08:30

## 2023-09-19 RX ADMIN — FENTANYL CITRATE 25 MCG: 50 INJECTION, SOLUTION INTRAMUSCULAR; INTRAVENOUS at 12:50

## 2023-09-19 RX ADMIN — EPHEDRINE SULFATE 10 MG: 50 INJECTION INTRAVENOUS at 11:55

## 2023-09-19 RX ADMIN — EPHEDRINE SULFATE 10 MG: 50 INJECTION INTRAVENOUS at 12:11

## 2023-09-19 RX ADMIN — ONDANSETRON 4 MG: 2 INJECTION INTRAMUSCULAR; INTRAVENOUS at 12:11

## 2023-09-19 RX ADMIN — FENTANYL CITRATE 25 MCG: 50 INJECTION, SOLUTION INTRAMUSCULAR; INTRAVENOUS at 12:55

## 2023-09-19 RX ADMIN — FENTANYL CITRATE 25 MCG: 50 INJECTION, SOLUTION INTRAMUSCULAR; INTRAVENOUS at 13:00

## 2023-09-19 RX ADMIN — DEXAMETHASONE SODIUM PHOSPHATE 4 MG: 4 INJECTION INTRA-ARTICULAR; INTRALESIONAL; INTRAMUSCULAR; INTRAVENOUS; SOFT TISSUE at 10:41

## 2023-09-19 RX ADMIN — ACETAMINOPHEN 1000 MG: 500 TABLET, FILM COATED ORAL at 10:41

## 2023-09-19 RX ADMIN — HYDROCODONE BITARTRATE AND ACETAMINOPHEN 1 TABLET: 10; 325 TABLET ORAL at 12:43

## 2023-09-19 RX ADMIN — SCOPALAMINE 1 PATCH: 1 PATCH, EXTENDED RELEASE TRANSDERMAL at 10:41

## 2023-09-19 RX ADMIN — PROPOFOL 200 MG: 10 INJECTION, EMULSION INTRAVENOUS at 11:22

## 2023-09-19 RX ADMIN — FENTANYL CITRATE 50 MCG: 50 INJECTION, SOLUTION INTRAMUSCULAR; INTRAVENOUS at 11:32

## 2023-09-19 RX ADMIN — LIDOCAINE HYDROCHLORIDE 100 MG: 20 INJECTION, SOLUTION EPIDURAL; INFILTRATION; INTRACAUDAL; PERINEURAL at 11:22

## 2023-09-19 NOTE — ANESTHESIA POSTPROCEDURE EVALUATION
Patient: Kyler Torres    Procedure Summary       Date: 09/19/23 Room / Location:  [facility] OR 09 /  [facility] OR    Anesthesia Start: 1119 Anesthesia Stop: 1232    Procedure: METATARSOPHALANGEAL (MTP) FUSION RIGHT GREAT TOE (Right: Toes) Diagnosis:       Osteoarthritis of first metatarsophalangeal (MTP) joint of right foot      (M19.079)    Surgeons: Jian Mcneal MD Provider: Kristofer Madden CRNA    Anesthesia Type: general ASA Status: 2            Anesthesia Type: general    Vitals  Vitals Value Taken Time   /78 09/19/23 1326   Temp 97.7 °F (36.5 °C) 09/19/23 1325   Pulse 69 09/19/23 1329   Resp 14 09/19/23 1325   SpO2 96 % 09/19/23 1329   Vitals shown include unvalidated device data.        Post Anesthesia Care and Evaluation    PONV Status: none  Comments: Patient d/c from PACU prior to anes eval based on Noble score.  Please see RN notes for details of d/c criteria.    Blood pressure 144/76, pulse 64, temperature 97.7 °F (36.5 °C), temperature source Temporal, resp. rate 16, SpO2 96 %.

## 2023-09-19 NOTE — BRIEF OP NOTE
TOE INTERPHALANGEAL JOINT/METATARSOPHALANGEAL JOINT FUSION  Progress Note    Kyler Torres  9/19/2023    Pre-op Diagnosis:   M19.079       Post-Op Diagnosis Codes:     * Osteoarthritis of first metatarsophalangeal (MTP) joint of right foot [M19.071]    Procedure/CPT® Codes:  ASC ARTHRODESIS GREAT TOE METATARSOPHALANGEAL JOINT [23956]      Procedure(s):  METATARSOPHALANGEAL (MTP) FUSION RIGHT GREAT TOE              Surgeon(s):  Jian Mcneal MD    Anesthesia: General with Block    Staff:   Circulator: Jeni Arroyo RN; Adriane Chan RN  Scrub Person: Lyubov Reid; Walter Ma         Estimated Blood Loss: minimal    Urine Voided: * No values recorded between 9/19/2023 11:20 AM and 9/19/2023 12:27 PM *    Specimens:                None          Drains: * No LDAs found *    Findings: see op note         Complications: none          Jian Mcneal MD     Date: 9/19/2023  Time: 12:31 CDT

## 2023-09-19 NOTE — ANESTHESIA PROCEDURE NOTES
Airway  Urgency: elective    Date/Time: 9/19/2023 11:24 AM    General Information and Staff    Patient location during procedure: OR  CRNA/CAA: Kristofer Madden CRNA    Indications and Patient Condition    Preoxygenated: yes  Mask difficulty assessment: 0 - not attempted    Final Airway Details  Final airway type: supraglottic airway      Successful airway: I-gel  Size 4     Number of attempts at approach: 1  Assessment: lips, teeth, and gum same as pre-op    Additional Comments  Placed by tamanna perkins

## 2023-09-19 NOTE — ANESTHESIA PREPROCEDURE EVALUATION
Anesthesia Evaluation     Patient summary reviewed   no history of anesthetic complications:   NPO Solid Status: > 8 hours             Airway   Mallampati: II  Dental      Pulmonary    (-) COPD, asthma, sleep apnea, not a smoker  Cardiovascular   Exercise tolerance: excellent (>7 METS)    (-) pacemaker, past MI, angina, cardiac stents      Neuro/Psych  (-) seizures, TIA, CVA  GI/Hepatic/Renal/Endo    (+) obesity, GERD  (-) liver disease, no renal disease, diabetes    Musculoskeletal     Abdominal    Substance History      OB/GYN          Other                      Anesthesia Plan    ASA 2     general     intravenous induction     Anesthetic plan, risks, benefits, and alternatives have been provided, discussed and informed consent has been obtained with: patient.    CODE STATUS:

## 2023-09-19 NOTE — H&P
Pt Name: Kyler Torres  MRN: [id number]  YOB: 1960  Date of evaluation: 9/19/2023    H&P including current review of systems was updated in the paper chart and/or the document previously scanned into the record.  There have been no significant changes or new problems since the original evaluation.  The patient's problems continue and indications for contemplated procedure have not changed.    Electronically signed by Jian Mcneal MD on 9/19/2023 at 09:13 CDT

## 2024-02-12 ENCOUNTER — OFFICE VISIT (OUTPATIENT)
Dept: BARIATRICS/WEIGHT MGMT | Facility: CLINIC | Age: 64
End: 2024-02-12
Payer: COMMERCIAL

## 2024-02-12 VITALS
WEIGHT: 222.8 LBS | SYSTOLIC BLOOD PRESSURE: 147 MMHG | TEMPERATURE: 98.2 F | OXYGEN SATURATION: 96 % | HEIGHT: 70 IN | BODY MASS INDEX: 31.9 KG/M2 | DIASTOLIC BLOOD PRESSURE: 91 MMHG | HEART RATE: 90 BPM

## 2024-02-12 DIAGNOSIS — R10.32 LEFT GROIN PAIN: Primary | ICD-10-CM

## 2024-02-12 PROCEDURE — 99204 OFFICE O/P NEW MOD 45 MIN: CPT | Performed by: SURGERY

## 2024-02-12 RX ORDER — LISINOPRIL 10 MG/1
1 TABLET ORAL DAILY
COMMUNITY
Start: 2024-02-06

## 2024-02-21 ENCOUNTER — HOSPITAL ENCOUNTER (OUTPATIENT)
Dept: ULTRASOUND IMAGING | Facility: HOSPITAL | Age: 64
Discharge: HOME | End: 2024-02-21
Admitting: SURGERY
Payer: COMMERCIAL

## 2024-02-21 PROCEDURE — 76705 ECHO EXAM OF ABDOMEN: CPT

## 2024-03-11 ENCOUNTER — OFFICE VISIT (OUTPATIENT)
Dept: BARIATRICS/WEIGHT MGMT | Facility: CLINIC | Age: 64
End: 2024-03-11
Payer: COMMERCIAL

## 2024-03-11 VITALS
BODY MASS INDEX: 32.56 KG/M2 | HEART RATE: 75 BPM | WEIGHT: 227.4 LBS | OXYGEN SATURATION: 95 % | TEMPERATURE: 97.3 F | DIASTOLIC BLOOD PRESSURE: 77 MMHG | SYSTOLIC BLOOD PRESSURE: 144 MMHG | HEIGHT: 70 IN

## 2024-03-11 DIAGNOSIS — R10.32 LEFT GROIN PAIN: Primary | ICD-10-CM

## 2024-03-11 PROCEDURE — 99213 OFFICE O/P EST LOW 20 MIN: CPT | Performed by: SURGERY

## 2024-03-11 NOTE — PROGRESS NOTES
General Surgery   History and Physical     Referring Provider: No ref. provider found    Patient Care Team:  Bhavna Ram DO as PCP - General (Internal Medicine)    Chief complaint: left groin pain    Subjective      History of present illness:  The patient is a 63 y.o. male presented in the past with groin pain.  Since his ultrasound report and rest he has had less pain in the left groin.  He states the pain is considerably less than its initial presentation.  I have reviewed the ultrasound report which showed no evidence of a hernia defect in his groin.    Review of Systems    Review of Systems - General ROS: negative  ENT ROS: negative  Respiratory ROS: no cough, shortness of breath, or wheezing  Cardiovascular ROS: no chest pain or dyspnea on exertion  Gastrointestinal ROS: no abdominal pain, change in bowel habits, or black or bloody stools    History  Past Medical History:   Diagnosis Date    Arthritis     gouty    Cancer     skin cancer   ,   Past Surgical History:   Procedure Laterality Date    BICEPS TENDON REPAIR Left 6/17/2019    Procedure: BICEPS TENDONESIS;  Surgeon: Olvin Drew MD;  Location:  [facility];  Service: Orthopedics    CARPAL TUNNEL RELEASE      right wrist    COLON SURGERY      colon resection    KNEE ARTHROSCOPY W/ MENISCAL REPAIR Left     RESECTION DISTAL CLAVICLE Left 6/17/2019    Procedure: DISTAL CLAVICLE EXCISION;  Surgeon: Olvin Drew MD;  Location:  [facility];  Service: Orthopedics    SHOULDER ARTHROSCOPY      left shoulder impingement repair    SHOULDER ARTHROSCOPY W/ ROTATOR CUFF REPAIR Left 6/17/2019    Procedure: LEFT SHOULDER ARTHROSCOPIC ROTATOR CUFF REPAIR TO INCLUDE SUBACROMIAL DECOMPRESSION;  Surgeon: Olvin Drew MD;  Location:  [facility];  Service: Orthopedics    TOE FUSION Right 9/19/2023    Procedure: METATARSOPHALANGEAL (MTP) FUSION RIGHT GREAT TOE;  Surgeon: Jian Mcneal MD;  Location:  [facility];  Service: Orthopedics;  Laterality:  Right;    WRIST SURGERY      cartiledge repair left   , History reviewed. No pertinent family history.,   Social History     Tobacco Use    Smoking status: Never    Smokeless tobacco: Current     Types: Chew   Substance Use Topics    Alcohol use: No    Drug use: No   , (Not in a hospital admission)   and Allergies:  Patient has no known allergies.    Current Outpatient Medications:     allopurinol (ZYLOPRIM) 100 MG tablet, Take 1 tablet by mouth 2 (Two) Times a Day., Disp: , Rfl:     diclofenac (VOLTAREN) 50 MG EC tablet, Take 1 tablet by mouth Every 12 (Twelve) Hours., Disp: , Rfl:     lisinopril (PRINIVIL,ZESTRIL) 10 MG tablet, Take 1 tablet by mouth Daily., Disp: , Rfl:     pantoprazole (PROTONIX) 40 MG EC tablet, Take 1 tablet by mouth Daily., Disp: , Rfl:     Objective     Vital Signs   Temp:  [97.3 °F (36.3 °C)] 97.3 °F (36.3 °C)  Heart Rate:  [75] 75  BP: (144)/(77) 144/77    Physical Exam:  Physical Exam  Constitutional:       Appearance: Normal appearance. He is obese.   Cardiovascular:      Rate and Rhythm: Normal rate and regular rhythm.      Pulses: Normal pulses.      Heart sounds: Normal heart sounds.   Pulmonary:      Effort: Pulmonary effort is normal.      Breath sounds: Normal breath sounds.   Abdominal:      General: Abdomen is flat. Bowel sounds are normal.      Palpations: Abdomen is soft.   Neurological:      Mental Status: He is alert.        Results Review:     Lab Results (last 24 hours)       ** No results found for the last 24 hours. **          Imaging Results (Last 24 Hours)       ** No results found for the last 24 hours. **            ASSESSMENT/PLAN   Diagnosis Plan   1. Left groin pain        His symptoms were more consistent with musculoskeletal as the etiology.  I recommended that he continue to increase his activity as tolerated.  If signs or symptoms of a hernia present which were explained to the patient he is more than welcome to return to our office as needed.      Marshall RON  MD Javier  03/11/24  08:41 CDT

## (undated) DEVICE — CANN PASSPORT BUTN 10MM 5CM

## (undated) DEVICE — TUBING, SUCTION, 1/4" X 12', STRAIGHT: Brand: MEDLINE

## (undated) DEVICE — ARM SLING II: Brand: DEROYAL

## (undated) DEVICE — PROB ABLAT SERFAS ENERGY 90S 3.5MM

## (undated) DEVICE — CANNULA THREADED FLEX 8.0 X 72MM: Brand: CLEAR-TRAC

## (undated) DEVICE — [TOMCAT CUTTER, ARTHROSCOPIC SHAVER BLADE,  DO NOT RESTERILIZE,  DO NOT USE IF PACKAGE IS DAMAGED,  KEEP DRY,  KEEP AWAY FROM SUNLIGHT]: Brand: FORMULA

## (undated) DEVICE — ENDO KIT,LOURDES HOSPITAL: Brand: MEDLINE INDUSTRIES, INC.

## (undated) DEVICE — PAD,ABDOMINAL,8"X10",ST,LF: Brand: MEDLINE

## (undated) DEVICE — 4-PORT MANIFOLD: Brand: NEPTUNE 2

## (undated) DEVICE — ANTIBACTERIAL UNDYED BRAIDED (POLYGLACTIN 910), SYNTHETIC ABSORBABLE SUTURE: Brand: COATED VICRYL

## (undated) DEVICE — BNDG ELAS ECON W/CLIP 3IN 5YD LF STRL

## (undated) DEVICE — PAD CAST SPECIST 3IN NS

## (undated) DEVICE — KT PUSHLOCK  2.9MM DISP

## (undated) DEVICE — 3M™ STERI-STRIP™ REINFORCED ADHESIVE SKIN CLOSURES, R1547, 1/2 IN X 4 IN (12 MM X 100 MM), 6 STRIPS/ENVELOPE: Brand: 3M™ STERI-STRIP™

## (undated) DEVICE — PAD,EYE,1-5/8X2 5/8,STERILE,LF,1/PK: Brand: MEDLINE

## (undated) DEVICE — 3.0MM PRECISION ROUND

## (undated) DEVICE — PK SHLDR 30

## (undated) DEVICE — CVR BRD ARM 13X30

## (undated) DEVICE — ELECTRD NDL EDGE/INSUL/PFTE.787MM 2.84IN

## (undated) DEVICE — SHOULDER STABILIZATION KIT,                                    DISPOSABLE 12 PER BOX

## (undated) DEVICE — TOWEL,OR,DSP,ST,BLUE,STD,4/PK,20PK/CS: Brand: MEDLINE

## (undated) DEVICE — TBG ARTHRO FLOWSTEADY/ST DISP

## (undated) DEVICE — PROXIMATE RH ROTATING HEAD SKIN STAPLERS (35 REGULAR) CONTAINS 35 STAINLESS STEEL STAPLES: Brand: PROXIMATE

## (undated) DEVICE — ADHS LIQ MASTISOL 2/3ML

## (undated) DEVICE — T-MAX DISPOSABLE FACE MASK 8 PER BOX

## (undated) DEVICE — DRSNG SURESITE WNDW 4X4.5

## (undated) DEVICE — DRSNG WND SIL OPTIFOAM GENTLE BRDR ADHS W/SA 4X4IN

## (undated) DEVICE — SYS SKIN CLS DERMABOND PRINEO W/22CM MESH TP

## (undated) DEVICE — GOWN,PREVENTION PLUS,XLONG/XLARGE,STRL: Brand: MEDLINE

## (undated) DEVICE — SUCTION MAT (LOW PROFILE), 50X34: Brand: NEPTUNE

## (undated) DEVICE — DRSNG GZ CURAD XEROFORM NONADHS 5X9IN STRL

## (undated) DEVICE — SHORT LENS-STERILE

## (undated) DEVICE — PK TURNOVER RM ADV

## (undated) DEVICE — SUT ETHLN 3/0 FS1 30IN 669H

## (undated) DEVICE — GLV SURG TRIUMPH GREEN W/ALOE PF LTX 8.5 STRL

## (undated) DEVICE — GLV SURG TRIUMPH ORTHO W/ALOE PF LTX 8.5 STRL

## (undated) DEVICE — TP NDL SCORPION MULTIFIRE

## (undated) DEVICE — CVR UNIV C/ARM

## (undated) DEVICE — SUT MNCRYL 2/0 SH 27IN UD MCP417H

## (undated) DEVICE — SUT MNCRYL 4/0 PS2 27IN UD MCP426H

## (undated) DEVICE — SUT MONOCRYL PLS ANTIB UND 3/0  PS1 27IN